# Patient Record
Sex: FEMALE | Race: WHITE | NOT HISPANIC OR LATINO | Employment: FULL TIME | URBAN - NONMETROPOLITAN AREA
[De-identification: names, ages, dates, MRNs, and addresses within clinical notes are randomized per-mention and may not be internally consistent; named-entity substitution may affect disease eponyms.]

---

## 2017-01-09 ENCOUNTER — TRANSCRIBE ORDERS (OUTPATIENT)
Dept: ADMINISTRATIVE | Facility: HOSPITAL | Age: 27
End: 2017-01-09

## 2017-01-09 ENCOUNTER — APPOINTMENT (OUTPATIENT)
Dept: LAB | Facility: CLINIC | Age: 27
End: 2017-01-09
Payer: COMMERCIAL

## 2017-01-09 ENCOUNTER — GENERIC CONVERSION - ENCOUNTER (OUTPATIENT)
Dept: OTHER | Facility: OTHER | Age: 27
End: 2017-01-09

## 2017-01-09 DIAGNOSIS — E04.1 NONTOXIC SINGLE THYROID NODULE: ICD-10-CM

## 2017-01-09 DIAGNOSIS — R94.6 ABNORMAL RESULTS OF THYROID FUNCTION STUDIES: ICD-10-CM

## 2017-01-09 LAB
T4 FREE SERPL-MCNC: 1.21 NG/DL (ref 0.76–1.46)
TSH SERPL DL<=0.05 MIU/L-ACNC: 1.16 UIU/ML (ref 0.36–3.74)

## 2017-01-09 PROCEDURE — 84439 ASSAY OF FREE THYROXINE: CPT

## 2017-01-09 PROCEDURE — 36415 COLL VENOUS BLD VENIPUNCTURE: CPT

## 2017-01-09 PROCEDURE — 84443 ASSAY THYROID STIM HORMONE: CPT

## 2017-01-24 ENCOUNTER — ALLSCRIPTS OFFICE VISIT (OUTPATIENT)
Dept: OTHER | Facility: OTHER | Age: 27
End: 2017-01-24

## 2017-05-01 DIAGNOSIS — R94.6 ABNORMAL RESULTS OF THYROID FUNCTION STUDIES: ICD-10-CM

## 2017-05-01 DIAGNOSIS — E78.5 HYPERLIPIDEMIA: ICD-10-CM

## 2017-05-03 ENCOUNTER — TRANSCRIBE ORDERS (OUTPATIENT)
Dept: LAB | Facility: CLINIC | Age: 27
End: 2017-05-03

## 2017-05-03 ENCOUNTER — APPOINTMENT (OUTPATIENT)
Dept: LAB | Facility: CLINIC | Age: 27
End: 2017-05-03
Payer: COMMERCIAL

## 2017-05-03 DIAGNOSIS — E78.5 HYPERLIPIDEMIA: ICD-10-CM

## 2017-05-03 DIAGNOSIS — R94.6 ABNORMAL RESULTS OF THYROID FUNCTION STUDIES: ICD-10-CM

## 2017-05-03 LAB
T4 FREE SERPL-MCNC: 0.93 NG/DL (ref 0.76–1.46)
TRIGL SERPL-MCNC: 166 MG/DL
TSH SERPL DL<=0.05 MIU/L-ACNC: 3.33 UIU/ML (ref 0.36–3.74)

## 2017-05-03 PROCEDURE — 84439 ASSAY OF FREE THYROXINE: CPT

## 2017-05-03 PROCEDURE — 84478 ASSAY OF TRIGLYCERIDES: CPT

## 2017-05-03 PROCEDURE — 36415 COLL VENOUS BLD VENIPUNCTURE: CPT

## 2017-05-03 PROCEDURE — 84443 ASSAY THYROID STIM HORMONE: CPT

## 2017-05-04 ENCOUNTER — GENERIC CONVERSION - ENCOUNTER (OUTPATIENT)
Dept: OTHER | Facility: OTHER | Age: 27
End: 2017-05-04

## 2017-05-30 ENCOUNTER — ALLSCRIPTS OFFICE VISIT (OUTPATIENT)
Dept: OTHER | Facility: OTHER | Age: 27
End: 2017-05-30

## 2017-06-22 DIAGNOSIS — E04.1 NONTOXIC SINGLE THYROID NODULE: ICD-10-CM

## 2017-06-28 ENCOUNTER — APPOINTMENT (OUTPATIENT)
Dept: LAB | Facility: CLINIC | Age: 27
End: 2017-06-28
Payer: COMMERCIAL

## 2017-06-28 ENCOUNTER — GENERIC CONVERSION - ENCOUNTER (OUTPATIENT)
Dept: OTHER | Facility: OTHER | Age: 27
End: 2017-06-28

## 2017-06-28 DIAGNOSIS — R94.6 ABNORMAL RESULTS OF THYROID FUNCTION STUDIES: ICD-10-CM

## 2017-06-28 LAB
T4 FREE SERPL-MCNC: 1.01 NG/DL (ref 0.76–1.46)
TSH SERPL DL<=0.05 MIU/L-ACNC: 2 UIU/ML (ref 0.36–3.74)

## 2017-06-28 PROCEDURE — 84439 ASSAY OF FREE THYROXINE: CPT

## 2017-06-28 PROCEDURE — 84443 ASSAY THYROID STIM HORMONE: CPT

## 2017-09-25 DIAGNOSIS — R94.6 ABNORMAL RESULTS OF THYROID FUNCTION STUDIES: ICD-10-CM

## 2017-09-25 DIAGNOSIS — E04.1 NONTOXIC SINGLE THYROID NODULE: ICD-10-CM

## 2017-09-26 ENCOUNTER — TRANSCRIBE ORDERS (OUTPATIENT)
Dept: LAB | Facility: CLINIC | Age: 27
End: 2017-09-26

## 2017-09-26 ENCOUNTER — APPOINTMENT (OUTPATIENT)
Dept: LAB | Facility: CLINIC | Age: 27
End: 2017-09-26
Payer: COMMERCIAL

## 2017-09-26 DIAGNOSIS — E04.1 NONTOXIC SINGLE THYROID NODULE: ICD-10-CM

## 2017-09-26 DIAGNOSIS — R94.6 NONSPECIFIC ABNORMAL RESULTS OF THYROID FUNCTION STUDY: Primary | ICD-10-CM

## 2017-09-26 LAB
T4 FREE SERPL-MCNC: 0.94 NG/DL (ref 0.76–1.46)
TSH SERPL DL<=0.05 MIU/L-ACNC: 2.69 UIU/ML (ref 0.36–3.74)

## 2017-09-26 PROCEDURE — 84443 ASSAY THYROID STIM HORMONE: CPT

## 2017-09-26 PROCEDURE — 36415 COLL VENOUS BLD VENIPUNCTURE: CPT

## 2017-09-26 PROCEDURE — 84439 ASSAY OF FREE THYROXINE: CPT

## 2017-09-27 ENCOUNTER — GENERIC CONVERSION - ENCOUNTER (OUTPATIENT)
Dept: OTHER | Facility: OTHER | Age: 27
End: 2017-09-27

## 2017-10-02 ENCOUNTER — ALLSCRIPTS OFFICE VISIT (OUTPATIENT)
Dept: OTHER | Facility: OTHER | Age: 27
End: 2017-10-02

## 2017-10-17 ENCOUNTER — HOSPITAL ENCOUNTER (OUTPATIENT)
Dept: ULTRASOUND IMAGING | Facility: HOSPITAL | Age: 27
Discharge: HOME | End: 2017-10-17
Payer: COMMERCIAL

## 2017-10-17 DIAGNOSIS — E04.1 NONTOXIC SINGLE THYROID NODULE: ICD-10-CM

## 2017-10-17 PROCEDURE — 76536 US EXAM OF HEAD AND NECK: CPT

## 2017-10-19 ENCOUNTER — GENERIC CONVERSION - ENCOUNTER (OUTPATIENT)
Dept: OTHER | Facility: OTHER | Age: 27
End: 2017-10-19

## 2017-10-27 NOTE — PROGRESS NOTES
Assessment  1  Hypothyroidism (244 9) (E03 9)   2  Hashimoto's thyroiditis (245 2) (E06 3)   3  Thyroid nodule (241 0) (E04 1)   4  Obesity (278 00) (E66 9)    Plan  Elevated TSH, Thyroid nodule    · Levothyroxine Sodium 25 MCG Oral Tablet (Synthroid)   Rx By: Naz Mccrary; Dispense: 30 Days ; #:45 Tablet; Refill: 0;For: Elevated TSH, Thyroid nodule; SOLANGE = N; Sent To: [facility]; Last Updated By: Luis Saenz; 10/2/2017 8:07:22 AM  Hashimoto's thyroiditis    · (1) T4, FREE; Status:Active; Requested for:99Low0708;    Perform:[facility]; Due:97Xbd5578; Ordered;For:Hashimoto's thyroiditis; Ordered By:Gayle Rai;  Hashimoto's thyroiditis, Hypothyroidism    · (1) TSH; Status:Active; Requested for:49Dmm9177;    Perform:[facility]; Due:12Tjo3945; Ordered;For:Hashimoto's thyroiditis, Hypothyroidism; Ordered By:Gayle Rai;  Hypothyroidism    · Levothyroxine Sodium 50 MCG Oral Tablet; take 1 tablet every day   Rx By: Luis Saenz; Dispense: 90 Days ; #:90 Tablet; Refill: 3;For: Hypothyroidism; SOLANGE = N; Verified Transmission to [facility]); Last Updated By: System, [employer]; 10/2/2017 8:08:51 AM  Thyroid nodule    · Follow-up visit in 6 months Evaluation and Treatment  Follow-up  Status: Complete   Done: 75YRG3156   Ordered; For: Thyroid nodule; Ordered By: Luis Saenz Performed:  Due: 15MYD0244; Last Updated By: Earl Wills; 10/2/2017 8:08:34 AM    Discussion/Summary  Hypothyroidism secondary to Hashimoto thyroiditis- TSH slightly above goal, goal TSH between 1 and 2  Increase levothyroxine to 50 mcg daily, repeat thyroid function test within the next 6-8 weeks  thyroid nodule - she has an upcoming thyroid ultrasound scheduled for the next 2 weeks, will follow     obesity- counseled and metabolic complications obesity, focus on dietary lifestyle modification and weight loss, repeat triglycerides next here      Counseling Documentation With Imm: The patient was counseled regarding diagnostic results,-- instructions for management,-- risk factor reductions,-- impressions,-- risks and benefits of treatment options,-- importance of compliance with treatment  Patient's Capacity to Self-Care: Patient is able to Self-Care  Medication SE Review and Pt Understands Tx: Possible side effects of new medications were reviewed with the patient/guardian today  The treatment plan was reviewed with the patient/guardian  The patient/guardian understands and agrees with the treatment plan      Chief Complaint  Chief Complaint Free Text Note Form: Follow up  History of Present Illness  Thyroid Nodule: The patient is being seen for a follow-up of a thyroid nodule  Nodule characteristics: located in the left lobe  Symptoms:  weight gain, but-- no neck mass,-- no neck pain,-- no dysphonia,-- no dysphagia,-- no fatigue,-- no anxiety,-- no irritability,-- no tremor-- and-- no frequent stools  The patient is not currently being treated for this problem  Pertinent medical history:  no radiation exposure  Family history:  Hashimoto's thyroiditis, but-- no thyroid cancer  Hypothyroidism: The patient is being seen for follow-up of hypothyroidism  The patient has Hashimoto's thyroiditis  Current treatment includes levothyroxine  Symptoms:  weight gain, but-- no fatigue,-- no weight loss,-- no insomnia,-- no constipation,-- no diarrhea,-- no dysphagia,-- no neck swelling-- and-- no peripheral edema  Risk factors:  no previous head/neck irradiation  Family history:  thyroid disorder  Review of Systems  ROS Reviewed:   ROS reviewed  Endo Adult ROS Female Established v2 Update - [facility]:   Constitutional/General: recent weight gain,-- no recent weight loss,-- no poor energy/fatigue,-- increased energy level,-- no insomnia/sleep problems,-- no fever-- and-- no feeling weak  Breasts: no nipple discharge     Heart: no high blood pressure,-- no chest pain/tightness,-- no rapid/racing heart rate-- and-- no palpitations  Genitourinary - Urinary: no frequent urination,-- no excess urination-- and-- no urinating during the night  Eyes: no blurred vision,-- no double vision,-- no bulging eyes,-- no gritty/scratchy eyes-- and-- no excessive tearing  Mouth / Throat: no hoarseness-- and-- no difficulty swallowing  Neck: no lumps,-- no swollen glands,-- no neck pain,-- no neck stiffness-- and-- no enlarged thyroid  Respiratory: no wheezing,-- no asthma-- and-- no persistent cough  Musculoskeletal: no muscle aches/pain,-- no joint aches/pain-- and-- no muscle weakness  Skin & Hair: no dry skin,-- no acne,-- the hair texture was not oily,-- no hair loss-- and-- no excessive hair growth  Gastrointestinal: no constipation,-- no diarrhea,-- no waking at night to drink-- and-- no stomach ache  Neurological: no blackouts,-- no weakness-- and-- no tremors  Reproductive: periods occur every 28 days,-- periods usually last 4-5 days,-- date of last menstruation is unknown,-- periods are regular,-- discomfort with periods,-- no excessive bleeding with periods-- and-- mood swings  Endocrine: no feeling hot frequently,-- no feeling cold frequently,-- no shifts between feeling hot and cold,-- no cold hands or feet,-- no excessive sweating,-- thyroid problems,-- no blood sugar problems,-- no excessive thirst,-- no excessive hunger,-- no change in shoe size,-- no nausea or vomiting-- and-- no shaky hands  Active Problems  1  Elevated TSH (794 5) (R94 6)   2  Fatigue (780 79) (R53 83)   3  Hyperlipidemia, unspecified (272 4) (E78 5)   4  Obesity (278 00) (E66 9)   5  Puncture wound of right lower leg (891 0) (S81 831A)   6  Thyroid nodule (241 0) (E04 1)    Past Medical History  1  History of dysmenorrhea (V13 29) (Z87 42)  Active Problems And Past Medical History Reviewed: The active problems and past medical history were reviewed and updated today  Surgical History  1  History of Oral Surgery Tooth Extraction [place] Tooth   2  History of Rotator Cuff Repair Acute   3  History of Tonsillectomy  Surgical History Reviewed: The surgical history was reviewed and updated today  Family History  Mother    1  Family history of arthritis (V17 7) (Z82 61)  Sister    2  Family history of arthritis (V17 7) (Z82 61)   3  Family history of Graves' disease (V18 19) (Z83 49)   4  Family history of Hashimoto thyroiditis (V18 19) (Z83 49)  Grandmother    5  Family history of malignant neoplasm (V16 9) (Z80 9)  Maternal Grandmother    6  Family history of hyperthyroidism (V18 19) (Z83 49)  Grandfather    7  Family history of Diabetes mellitus, stable   8  Family history of cardiac disorder (V17 49) (Z82 49)   9  Family history of malignant neoplasm (V16 9) (Z80 9)  Maternal Aunt    10  Family history of hyperthyroidism (V18 19) (Z83 49)  Paternal Aunt    6  Family history of goiter (V18 19) (Z83 49)   12  Family history of hyperthyroidism (V18 19) (Z83 49)  Family History Reviewed: The family history was reviewed and updated today  Social History   · Denied: History of Current smoker   · Occasional caffeine consumption   · Social alcohol use (Z78 9)  Social History Reviewed: The social history was reviewed and updated today  Current Meds   1  Flexeril 5 MG TABS; Therapy: (Recorded:24Oct2016) to Recorded   2  Levothyroxine Sodium 25 MCG Oral Tablet; take 1 5 tablet by mouth once daily; Therapy: 19YKJ4488 to (Evaluate:10Aug2017)  Requested for: 57DEI5023; Last   Rx:95Tun5636 Ordered   3  Ocella TABS Recorded   4  Sertraline HCl - 50 MG Oral Tablet; take 1 tablet by mouth once daily; Therapy: (Recorded:22Nov2016) to Recorded  Medication List Reviewed: The medication list was reviewed and updated today  Allergies  1   Tussi-12 SUSP    Vitals  Vital Signs    Recorded: 03SVT0709 07:53AM   Heart Rate 76   Systolic 693   Diastolic 80   Height 5 ft 5 in   Weight 189 lb 4 00 oz   BMI Calculated 31 49   BSA Calculated 1 93     Physical Exam    Constitutional   General appearance: No acute distress, well appearing and well nourished  Eyes   Conjunctiva and lids: No swelling, erythema, or discharge  Pupils: Equal, round and reactive to light  The sclera are anicteric  Extraocular movements are intact  Ears, Nose, Mouth, and Throat   External inspection of ears, nose and lips: Normal     Exam of Head: The head is atraumatic and normocephalic  Neck: The neck is supple  The thyroid is normal in size with no palpable nodules  Pulmonary   Auscultation of lungs: Clear to auscultation bilaterally with normal chest expansion  Cardiovascular   Auscultation of heart: Normal rate and rhythm with no murmurs, gallops or rubs  Examination of extremities for edema and/or varicosities: Normal     Abdomen   Abdomen: Abdomen is soft, non-tender with normal bowel sounds  Lymphatic   Palpation of lymph nodes: No supraclavicular or suboccipital lymphadenopathy  Musculoskeletal   Gait and station: Normal     Inspection/palpation of joints, bones, and muscles: Muscle bulk and tone is normal     Skin   Skin and subcutaneous tissue: Normal skin temperature and color  Neurologic   Motor Strength: Strength is 5/5 bilaterally  Psychiatric   Orientation to person, place and time: Normal     Mood and affect: Affect and attention span are normal        Results/Data  (1) TSH 73Ddf0214 07:24AM [facility] Order Number: [id number]     Test Name Result Flag Reference   TSH 2 690 uIU/mL  0 358-3 740   Patients undergoing fluorescein dye angiography may retain small amounts of fluorescein in the body for 48-72 hours post procedure  Samples containing fluorescein can produce falsely depressed TSH values  If the patient had this procedure,a specimen should be resubmitted post fluorescein clearance            The recommended reference ranges for TSH during pregnancy are as follows:  First trimester 0 1 to 2 5 uIU/mL  Second trimester  0 2 to 3 0 uIU/mL  Third trimester 0 3 to 3 0 uIU/m     (1) T4, FREE 95Wcv5507 07:24AM Mildred Emmanuel Order Number: [id number]     Test Name Result Flag Reference   T4,FREE 0 94 ng/dL  0 76-1 46   Specimen collection should occur prior to Sulfasalazine administration due to the potential for falsely elevated results       Future Appointments    Date/Time Provider Specialty Site   04/02/2018 08:00 AM Kristopher Rascon [facility] ENDOCRINOLOG[facility]     Signatures   Electronically signed by : MOUNA Mcclendon ; Oct  2 2017  8:16AM EST                       (Author)

## 2017-11-08 ENCOUNTER — TRANSCRIBE ORDERS (OUTPATIENT)
Dept: LAB | Facility: CLINIC | Age: 27
End: 2017-11-08

## 2017-11-08 ENCOUNTER — APPOINTMENT (OUTPATIENT)
Dept: LAB | Facility: CLINIC | Age: 27
End: 2017-11-08
Payer: COMMERCIAL

## 2017-11-08 DIAGNOSIS — R94.6 NONSPECIFIC ABNORMAL RESULTS OF THYROID FUNCTION STUDY: ICD-10-CM

## 2017-11-08 LAB
T4 FREE SERPL-MCNC: 1.19 NG/DL (ref 0.76–1.46)
TSH SERPL DL<=0.05 MIU/L-ACNC: 0.81 UIU/ML (ref 0.36–3.74)

## 2017-11-08 PROCEDURE — 36415 COLL VENOUS BLD VENIPUNCTURE: CPT

## 2017-11-08 PROCEDURE — 84439 ASSAY OF FREE THYROXINE: CPT

## 2017-11-08 PROCEDURE — 84443 ASSAY THYROID STIM HORMONE: CPT

## 2017-11-09 ENCOUNTER — GENERIC CONVERSION - ENCOUNTER (OUTPATIENT)
Dept: OTHER | Facility: OTHER | Age: 27
End: 2017-11-09

## 2017-12-01 DIAGNOSIS — E06.3 AUTOIMMUNE THYROIDITIS: ICD-10-CM

## 2017-12-01 DIAGNOSIS — E03.9 HYPOTHYROIDISM: ICD-10-CM

## 2018-01-12 VITALS
HEART RATE: 76 BPM | DIASTOLIC BLOOD PRESSURE: 80 MMHG | HEIGHT: 65 IN | WEIGHT: 189.25 LBS | BODY MASS INDEX: 31.53 KG/M2 | SYSTOLIC BLOOD PRESSURE: 124 MMHG

## 2018-01-13 VITALS
HEART RATE: 76 BPM | BODY MASS INDEX: 30.66 KG/M2 | HEIGHT: 65 IN | WEIGHT: 184.01 LBS | DIASTOLIC BLOOD PRESSURE: 76 MMHG | SYSTOLIC BLOOD PRESSURE: 102 MMHG

## 2018-01-13 NOTE — RESULT NOTES
Verified Results  (1) TSH 28Jun2017 07:20AM Flora Mchugh Order Number: [id number]     Test Name Result Flag Reference   TSH 2 000 uIU/mL  0 358-3 740   Patients undergoing fluorescein dye angiography may retain small amounts of fluorescein in the body for 48-72 hours post procedure  Samples containing fluorescein can produce falsely depressed TSH values  If the patient had this procedure,a specimen should be resubmitted post fluorescein clearance            The recommended reference ranges for TSH during pregnancy are as follows:  First trimester 0 1 to 2 5 uIU/mL  Second trimester  0 2 to 3 0 uIU/mL  Third trimester 0 3 to 3 0 uIU/m     (1) T4, FREE 28Jun2017 07:20AM Flora Mchugh Order Number: [id number]     Test Name Result Flag Reference   T4,FREE 1 01 ng/dL  0 76-1 46

## 2018-01-13 NOTE — RESULT NOTES
Discussion/Summary   Normal        Verified Results  (1) T4, FREE 63MXJ4766 07:15AM Sara Camacho Kocher     Test Name Result Flag Reference   T4,FREE 1 19 ng/dL  0 76-1 46   Specimen collection should occur prior to Sulfasalazine administration due to the potential for falsely elevated results  (1) TSH 83MGJ4003 07:15AM Roskos, Jonelle Kocher     Test Name Result Flag Reference   TSH 0 810 uIU/mL  0 358-3 740   This is a patient instruction: This test is non-fasting  Please drink two glasses of water morning of bloodwork  Patients undergoing fluorescein dye angiography may retain small amounts of fluorescein in the body for 48-72 hours post procedure  Samples containing fluorescein can produce falsely depressed TSH values  If the patient had this procedure,a specimen should be resubmitted post fluorescein clearance            The recommended reference ranges for TSH during pregnancy are as follows:  First trimester 0 1 to 2 5 uIU/mL  Second trimester  0 2 to 3 0 uIU/mL  Third trimester 0 3 to 3 0 uIU/m

## 2018-01-15 VITALS
BODY MASS INDEX: 30.16 KG/M2 | HEIGHT: 65 IN | DIASTOLIC BLOOD PRESSURE: 80 MMHG | HEART RATE: 84 BPM | SYSTOLIC BLOOD PRESSURE: 122 MMHG | WEIGHT: 181.03 LBS

## 2018-01-15 NOTE — RESULT NOTES
Message   has upcoming f/u     Verified Results  (1) TSH [id number] 07:16AM Jammie New Order Number: [id number]     Test Name Result Flag Reference   TSH 1 160 uIU/mL  0 358-3 740   - Patient Instructions: This bloodwork is non-fasting  Please drink two glasses of water morning of bloodwork  - Patient Instructions: This bloodwork is non-fasting  Please drink two glasses of water morning of bloodwork  Patients undergoing fluorescein dye angiography may retain small amounts of fluorescein in the body for 48-72 hours post procedure  Samples containing fluorescein can produce falsely depressed TSH values  If the patient had this procedure,a specimen should be resubmitted post fluorescein clearance  The recommended reference ranges for TSH during pregnancy are as follows:  First trimester 0 1 to 2 5 uIU/mL  Second trimester  0 2 to 3 0 uIU/mL  Third trimester 0 3 to 3 0 uIU/m     (1) T4, FREE [id number] 07:16AM Kaylyn Moreno   LISETH Order Number: [id number]     Test Name Result Flag Reference   T4,FREE 1 21 ng/dL  0 76-1 46   - Patient Instructions: This bloodwork is non-fasting  Please drink two glasses of water morning of bloodwork

## 2018-01-15 NOTE — RESULT NOTES
Discussion/Summary    thyroid labs are normal, but not optimal  May do better with slightly higher does  Is she taking medication daily and properly with no missed doses? If not, make sure to take as directed  If she is taking properly, increase to 1 5 tabs per day  Check TSH/Free T4 in 6 weeks  Also, triglycerides improving         Verified Results  (1) TSH [id number] 07:13AM [facility]: [id number]     Test Name Result Flag Reference   TSH 3 330 uIU/mL  0 358-3 740   - Patient Instructions: This bloodwork is non-fasting  Please drink two glasses of water morning of bloodwork  Patients undergoing fluorescein dye angiography may retain small amounts of fluorescein in the body for 48-72 hours post procedure  Samples containing fluorescein can produce falsely depressed TSH values  If the patient had this procedure,a specimen should be resubmitted post fluorescein clearance  The recommended reference ranges for TSH during pregnancy are as follows:  First trimester 0 1 to 2 5 uIU/mL  Second trimester  0 2 to 3 0 uIU/mL  Third trimester 0 3 to 3 0 uIU/m     (1) T4, FREE [id number] 07:13AM [facility] Order Number: [id number]     Test Name Result Flag Reference   T4,FREE 0 93 ng/dL  0 76-1 46     (1) TRIGLYCERIDE [id number] 07:13AM [facility] Order Number: [id number]     Test Name Result Flag Reference   TRIGLYCERIDES 166 mg/dL H <=150   Specimen collection should occur prior to N-Acetylcysteine or Metamizole administration due to the potential for falsely depressed results        Triglyceride:         Normal              <150 mg/dl       Borderline High    150-199 mg/dl       High               200-499 mg/dl       Very High          >499 mg/dl

## 2018-01-15 NOTE — RESULT NOTES
Message   tsh slightly high - start levothyroxine 25 mcg daily and repeat tsh and free t4 in 6 weeks      Verified Results  (1) TSH 40WMS1341 04:29PM Darline Mention   TW Order Number: [id number]     Test Name Result Flag Reference   TSH 4 120 uIU/mL H 0 358-3 740   - Patient Instructions: This bloodwork is non-fasting  Please drink two glasses of water morning of bloodwork  - Patient Instructions: This bloodwork is non-fasting  Please drink two glasses of water morning of bloodwork  Patients undergoing fluorescein dye angiography may retain small amounts of fluorescein in the body for 48-72 hours post procedure  Samples containing fluorescein can produce falsely depressed TSH values  If the patient had this procedure,a specimen should be resubmitted post fluorescein clearance  The recommended reference ranges for TSH during pregnancy are as follows:  First trimester 0 1 to 2 5 uIU/mL  Second trimester  0 2 to 3 0 uIU/mL  Third trimester 0 3 to 3 0 uIU/m     (1) T4, FREE 22Nov2016 04:29PM Darline Mention   TW Order Number: [id number]     Test Name Result Flag Reference   T4,FREE 0 96 ng/dL  0 76-1 46   - Patient Instructions: This bloodwork is non-fasting  Please drink two glasses of water morning of bloodwork       (1) ANTITHYROGLOBULIN ANTIBODY 21PGN0154 04:29PM Darline Mention   TW Order Number: [id number]     Test Name Result Flag Reference   THYROGLOB AB 1 9 IU/mL H 0 0 - 0 9   Thyroglobulin Antibody measured by [facility] Methodology    Performed at:  [address]  [id number]  : Francisco Lombardi MD, Phone:  [phone]

## 2018-01-16 NOTE — RESULT NOTES
Discussion/Summary   normal appt 10/2     Verified Results  (1) TSH 20Gni7585 07:24AM Gloria Nemesio Order Number: [id number]     Test Name Result Flag Reference   TSH 2 690 uIU/mL  0 358-3 740   Patients undergoing fluorescein dye angiography may retain small amounts of fluorescein in the body for 48-72 hours post procedure  Samples containing fluorescein can produce falsely depressed TSH values  If the patient had this procedure,a specimen should be resubmitted post fluorescein clearance  The recommended reference ranges for TSH during pregnancy are as follows:  First trimester 0 1 to 2 5 uIU/mL  Second trimester  0 2 to 3 0 uIU/mL  Third trimester 0 3 to 3 0 uIU/m     (1) T4, FREE [id number] 07:24AM Gloria Nemesio Order Number: [id number]     Test Name Result Flag Reference   T4,FREE 0 94 ng/dL  0 76-1 46   Specimen collection should occur prior to Sulfasalazine administration due to the potential for falsely elevated results

## 2018-03-28 RX ORDER — DROSPIRENONE AND ETHINYL ESTRADIOL 0.03MG-3MG
1 KIT ORAL DAILY
COMMUNITY

## 2018-03-28 RX ORDER — LEVOTHYROXINE SODIUM 0.05 MG/1
1 TABLET ORAL DAILY
COMMUNITY
Start: 2017-10-02 | End: 2018-04-02 | Stop reason: SDUPTHER

## 2018-03-28 RX ORDER — CYCLOBENZAPRINE HCL 5 MG
1 TABLET ORAL AS NEEDED
COMMUNITY
End: 2019-04-08 | Stop reason: ALTCHOICE

## 2018-04-02 ENCOUNTER — OFFICE VISIT (OUTPATIENT)
Dept: ENDOCRINOLOGY | Facility: CLINIC | Age: 28
End: 2018-04-02
Payer: COMMERCIAL

## 2018-04-02 VITALS
BODY MASS INDEX: 32.65 KG/M2 | HEIGHT: 65 IN | HEART RATE: 106 BPM | SYSTOLIC BLOOD PRESSURE: 110 MMHG | DIASTOLIC BLOOD PRESSURE: 70 MMHG | WEIGHT: 196 LBS

## 2018-04-02 DIAGNOSIS — E04.1 THYROID NODULE: Primary | ICD-10-CM

## 2018-04-02 DIAGNOSIS — E06.3 HASHIMOTO'S THYROIDITIS: ICD-10-CM

## 2018-04-02 DIAGNOSIS — E03.9 HYPOTHYROIDISM, UNSPECIFIED TYPE: ICD-10-CM

## 2018-04-02 DIAGNOSIS — E78.5 HYPERLIPIDEMIA, UNSPECIFIED HYPERLIPIDEMIA TYPE: ICD-10-CM

## 2018-04-02 PROBLEM — R53.83 FATIGUE: Status: ACTIVE | Noted: 2017-05-30

## 2018-04-02 PROBLEM — E66.9 OBESITY: Status: ACTIVE | Noted: 2017-01-24

## 2018-04-02 PROCEDURE — 99214 OFFICE O/P EST MOD 30 MIN: CPT | Performed by: PHYSICIAN ASSISTANT

## 2018-04-02 RX ORDER — LEVOTHYROXINE SODIUM 0.05 MG/1
50 TABLET ORAL DAILY
Qty: 90 TABLET | Refills: 3 | Status: SHIPPED | OUTPATIENT
Start: 2018-04-02 | End: 2018-10-04

## 2018-04-02 NOTE — LETTER
April 2, 2018     Vidya Tellez PA-C  [address]    Patient: Juventino Pimentel   YOB: 1990   Date of Visit: 4/2/2018       Dear Dr Stanford Ra: Thank you for referring Gissel Hong to me for evaluation  Below are my notes for this consultation  If you have questions, please do not hesitate to call me  I look forward to following your patient along with you  Sincerely,        Loly Mishra PA-C        CC: No Recipients  Loly Mishra PA-C  4/2/2018  8:40 AM  Sign at close encounter    Established Patient Progress Note       Chief Complaint   Patient presents with    Thyroid Problem    Hypothyroidism        History of Present Illness:     Juventino Pimentel is a 32 y o  female with a history of hypothyroidism due to hashimoto's thyroiditis  She is taking levothyroxine 50mcg daily  In generally, feeling Ok  Energy levels Ok  Still some difficulty sleeping  She is on OCP with no plans for pregnancy  She is on zoloft since age 13, considering talking to PCP about coming off medication since this summer since depression has not been a problem  For the thyroid nodules, she had ultrasound 10/2017 which showed no nodule but small possible lymph node  Repeat u/s recommended in 1 year  She sometimes has dysphagia when turning her head to the side or when wakes up in middle of night  She coleman have history of mild elevation of triglycerides which showed some improvement in 5/2017 labs  Patient Active Problem List   Diagnosis    Elevated TSH    Fatigue    Hashimoto's thyroiditis    Hyperlipidemia, unspecified    Hypothyroidism    Obesity    Thyroid nodule      History reviewed  No pertinent past medical history     Past Surgical History:   Procedure Laterality Date    NOSE SURGERY      ROTATOR CUFF REPAIR Right     TONSILLECTOMY        Family History   Problem Relation Age of Onset    Thyroid disease unspecified Sister    Donovan Early Thyroid disease unspecified Maternal Aunt     Breast cancer Maternal Grandmother     Thyroid disease unspecified Maternal Grandmother     Diabetes unspecified Maternal Grandfather      Social History   Substance Use Topics    Smoking status: Never Smoker    Smokeless tobacco: Never Used    Alcohol use Yes     Allergies   Allergen Reactions    Tussin [Guaifenesin] Hives     Tussi-12 reaction in childhood (hives)       Current Outpatient Prescriptions:     cyclobenzaprine (FLEXERIL) 5 mg tablet, Take 1 tablet by mouth as needed, Disp: , Rfl:     drospirenone-ethinyl estradiol (OCELLA) 3-0 03 MG per tablet, Take 1 tablet by mouth daily, Disp: , Rfl:     levothyroxine 50 mcg tablet, Take 1 tablet (50 mcg total) by mouth daily for 90 days, Disp: 90 tablet, Rfl: 3    sertraline (ZOLOFT) 50 mg tablet, Take 1 tablet by mouth daily, Disp: , Rfl:     Review of Systems   Constitutional: Negative for activity change, appetite change, chills, diaphoresis, fatigue, fever and unexpected weight change  HENT: Negative for trouble swallowing and voice change  Eyes: Negative for visual disturbance  Respiratory: Negative for shortness of breath  Cardiovascular: Negative for chest pain and palpitations  Gastrointestinal: Negative for abdominal pain, constipation and diarrhea  Endocrine: Negative for cold intolerance, heat intolerance, polydipsia, polyphagia and polyuria  Genitourinary: Negative for frequency and menstrual problem  Musculoskeletal: Negative for arthralgias and myalgias  Skin: Negative for rash  Allergic/Immunologic: Negative for food allergies  Neurological: Negative for dizziness and tremors  Hematological: Negative for adenopathy  Psychiatric/Behavioral: Positive for sleep disturbance  All other systems reviewed and are negative  Physical Exam:  Body mass index is 32 62 kg/m²    /70   Pulse (!) 106   Ht 5' 5" (1 651 m)   Wt 88 9 kg (196 lb)   BMI 32 62 kg/m²     Wt Readings from Last 3 Encounters:   04/02/18 88 9 kg (196 lb)   10/02/17 85 8 kg (189 lb 4 oz)   05/30/17 83 5 kg (184 lb 0 1 oz)       Physical Exam   Constitutional: She is oriented to person, place, and time  She appears well-developed and well-nourished  No distress  HENT:   Head: Normocephalic and atraumatic  Eyes: Conjunctivae are normal  Pupils are equal, round, and reactive to light  Neck: Normal range of motion  Neck supple  No thyromegaly present  Cardiovascular: Normal rate, regular rhythm and normal heart sounds  Pulmonary/Chest: Effort normal and breath sounds normal  No respiratory distress  She has no wheezes  She has no rales  Abdominal: Soft  Bowel sounds are normal  She exhibits no distension  There is no tenderness  Musculoskeletal: Normal range of motion  She exhibits no edema  Neurological: She is alert and oriented to person, place, and time  Skin: Skin is warm and dry  Psychiatric: She has a normal mood and affect  Vitals reviewed  Labs:     11/8/2017 TSH 0 810 Free T4 1 19  Lab Results   Component Value Date    CREATININE 0 86 10/11/2016    BUN 10 10/11/2016     10/11/2016    K 3 7 10/11/2016     10/11/2016    CO2 23 10/11/2016     eGFR   Date Value Ref Range Status   10/11/2016 >60 0 ml/min/1 73sq m Final       Lab Results   Component Value Date    CHOL 190 10/11/2016    HDL 74 (H) 10/11/2016    TRIG 166 (H) 05/03/2017       Lab Results   Component Value Date    ALT 49 10/11/2016    AST 27 10/11/2016    ALKPHOS 68 10/11/2016    BILITOT 0 61 10/11/2016       Lab Results   Component Value Date    FREET4 1 19 11/08/2017         Impression & Plan:    Problem List Items Addressed This Visit     Hashimoto's thyroiditis    Relevant Medications    levothyroxine 50 mcg tablet    Hyperlipidemia, unspecified     Elevated triglycerides improving based on 5/2017 labs    Will repeat with next lab test           Relevant Orders    Triglycerides Lab Collect Hypothyroidism     TSH at goal based on last lab testing  Continue medication  Repeat testing in about 1 month and then again 6 weeks after stopping zoloft if plans to stop zoloft due to possible need for dose change  Relevant Medications    levothyroxine 50 mcg tablet    Other Relevant Orders    TSH, 3rd generation    T4, free    TSH, 3rd generation    T4, free    US thyroid    Thyroid nodule - Primary     Resolved on 10/2017 ultrasound however will repeat u/s 10/2018 to evaluate lymph node, probably reactive according to last u/s report  Relevant Medications    levothyroxine 50 mcg tablet    Other Relevant Orders    TSH, 3rd generation    T4, free    TSH, 3rd generation    T4, free    US thyroid          Orders Placed This Encounter   Procedures    US thyroid     Standing Status:   Future     Standing Expiration Date:   4/2/2019     Scheduling Instructions:      No prep required  Please bring your physician order, insurance cards, a form of photo ID and a list of your medications with you  Arrive 15 minutes prior to your appointment time in order to register  Order Specific Question:   Reason for Exam:     Answer:   thyroid nodule/lymph node     Order Specific Question:   Is the patient pregnant? Answer:   No    TSH, 3rd generation     This is a patient instruction: This test is non-fasting  Please drink two glasses of water morning of bloodwork   T4, free    TSH, 3rd generation     This is a patient instruction: This test is non-fasting  Please drink two glasses of water morning of bloodwork  Standing Status:   Future     Standing Expiration Date:   4/2/2019    T4, free     Standing Status:   Future     Standing Expiration Date:   4/2/2019    Triglycerides Lab Collect     Standing Status:   Future     Standing Expiration Date:   4/2/2019       Patient Instructions   Continue levothyroxine at current dose  Check Lab testing for TSH/Free T4 in about 1 month    If you come off zoloft do lab testing 6 weeks after stopping zoloft again for TSH/Free T4  Repeat ultrasound in October, before next visit  Discussed with the patient and all questioned fully answered  She will call me if any problems arise  Follow-up appointment in 6 months       Counseled patient on diagnostic results, prognosis, risk and benefit of treatment options, instruction for management, importance of treatment compliance, Risk  factor reduction and impressions      Renee Cherry PA-C

## 2018-04-02 NOTE — PROGRESS NOTES
Established Patient Progress Note       Chief Complaint   Patient presents with    Thyroid Problem    Hypothyroidism        History of Present Illness:     Juventino Pimentel is a 32 y o  female with a history of hypothyroidism due to hashimoto's thyroiditis  She is taking levothyroxine 50mcg daily  In generally, feeling Ok  Energy levels Ok  Still some difficulty sleeping  She is on OCP with no plans for pregnancy  She is on zoloft since age 13, considering talking to PCP about coming off medication since this summer since depression has not been a problem  For the thyroid nodules, she had ultrasound 10/2017 which showed no nodule but small possible lymph node  Repeat u/s recommended in 1 year  She sometimes has dysphagia when turning her head to the side or when wakes up in middle of night  She coleman have history of mild elevation of triglycerides which showed some improvement in 5/2017 labs  Patient Active Problem List   Diagnosis    Elevated TSH    Fatigue    Hashimoto's thyroiditis    Hyperlipidemia, unspecified    Hypothyroidism    Obesity    Thyroid nodule      History reviewed  No pertinent past medical history     Past Surgical History:   Procedure Laterality Date    NOSE SURGERY      ROTATOR CUFF REPAIR Right     TONSILLECTOMY        Family History   Problem Relation Age of Onset    Thyroid disease unspecified Sister     Thyroid disease unspecified Maternal Aunt     Breast cancer Maternal Grandmother     Thyroid disease unspecified Maternal Grandmother     Diabetes unspecified Maternal Grandfather      Social History   Substance Use Topics    Smoking status: Never Smoker    Smokeless tobacco: Never Used    Alcohol use Yes     Allergies   Allergen Reactions    Tussin [Guaifenesin] Hives     Tussi-12 reaction in childhood (hives)       Current Outpatient Prescriptions:     cyclobenzaprine (FLEXERIL) 5 mg tablet, Take 1 tablet by mouth as needed, Disp: , Rfl:    drospirenone-ethinyl estradiol (OCELLA) 3-0 03 MG per tablet, Take 1 tablet by mouth daily, Disp: , Rfl:     levothyroxine 50 mcg tablet, Take 1 tablet (50 mcg total) by mouth daily for 90 days, Disp: 90 tablet, Rfl: 3    sertraline (ZOLOFT) 50 mg tablet, Take 1 tablet by mouth daily, Disp: , Rfl:     Review of Systems   Constitutional: Negative for activity change, appetite change, chills, diaphoresis, fatigue, fever and unexpected weight change  HENT: Negative for trouble swallowing and voice change  Eyes: Negative for visual disturbance  Respiratory: Negative for shortness of breath  Cardiovascular: Negative for chest pain and palpitations  Gastrointestinal: Negative for abdominal pain, constipation and diarrhea  Endocrine: Negative for cold intolerance, heat intolerance, polydipsia, polyphagia and polyuria  Genitourinary: Negative for frequency and menstrual problem  Musculoskeletal: Negative for arthralgias and myalgias  Skin: Negative for rash  Allergic/Immunologic: Negative for food allergies  Neurological: Negative for dizziness and tremors  Hematological: Negative for adenopathy  Psychiatric/Behavioral: Positive for sleep disturbance  All other systems reviewed and are negative  Physical Exam:  Body mass index is 32 62 kg/m²  /70   Pulse (!) 106   Ht 5' 5" (1 651 m)   Wt 88 9 kg (196 lb)   BMI 32 62 kg/m²    Wt Readings from Last 3 Encounters:   04/02/18 88 9 kg (196 lb)   10/02/17 85 8 kg (189 lb 4 oz)   05/30/17 83 5 kg (184 lb 0 1 oz)       Physical Exam   Constitutional: She is oriented to person, place, and time  She appears well-developed and well-nourished  No distress  HENT:   Head: Normocephalic and atraumatic  Eyes: Conjunctivae are normal  Pupils are equal, round, and reactive to light  Neck: Normal range of motion  Neck supple  No thyromegaly present  Cardiovascular: Normal rate, regular rhythm and normal heart sounds      Pulmonary/Chest: Effort normal and breath sounds normal  No respiratory distress  She has no wheezes  She has no rales  Abdominal: Soft  Bowel sounds are normal  She exhibits no distension  There is no tenderness  Musculoskeletal: Normal range of motion  She exhibits no edema  Neurological: She is alert and oriented to person, place, and time  Skin: Skin is warm and dry  Psychiatric: She has a normal mood and affect  Vitals reviewed  Labs:     11/8/2017 TSH 0 810 Free T4 1 19  Lab Results   Component Value Date    CREATININE 0 86 10/11/2016    BUN 10 10/11/2016     10/11/2016    K 3 7 10/11/2016     10/11/2016    CO2 23 10/11/2016     eGFR   Date Value Ref Range Status   10/11/2016 >60 0 ml/min/1 73sq m Final       Lab Results   Component Value Date    CHOL 190 10/11/2016    HDL 74 (H) 10/11/2016    TRIG 166 (H) 05/03/2017       Lab Results   Component Value Date    ALT 49 10/11/2016    AST 27 10/11/2016    ALKPHOS 68 10/11/2016    BILITOT 0 61 10/11/2016       Lab Results   Component Value Date    FREET4 1 19 11/08/2017         Impression & Plan:    Problem List Items Addressed This Visit     Hashimoto's thyroiditis    Relevant Medications    levothyroxine 50 mcg tablet    Hyperlipidemia, unspecified     Elevated triglycerides improving based on 5/2017 labs  Will repeat with next lab test           Relevant Orders    Triglycerides Lab Collect    Hypothyroidism     TSH at goal based on last lab testing  Continue medication  Repeat testing in about 1 month and then again 6 weeks after stopping zoloft if plans to stop zoloft due to possible need for dose change  Relevant Medications    levothyroxine 50 mcg tablet    Other Relevant Orders    TSH, 3rd generation    T4, free    TSH, 3rd generation    T4, free    US thyroid    Thyroid nodule - Primary     Resolved on 10/2017 ultrasound however will repeat u/s 10/2018 to evaluate lymph node, probably reactive according to last u/s report  Relevant Medications    levothyroxine 50 mcg tablet    Other Relevant Orders    TSH, 3rd generation    T4, free    TSH, 3rd generation    T4, free    US thyroid          Orders Placed This Encounter   Procedures    US thyroid     Standing Status:   Future     Standing Expiration Date:   4/2/2019     Scheduling Instructions:      No prep required  Please bring your physician order, insurance cards, a form of photo ID and a list of your medications with you  Arrive 15 minutes prior to your appointment time in order to register  Order Specific Question:   Reason for Exam:     Answer:   thyroid nodule/lymph node     Order Specific Question:   Is the patient pregnant? Answer:   No    TSH, 3rd generation     This is a patient instruction: This test is non-fasting  Please drink two glasses of water morning of bloodwork   T4, free    TSH, 3rd generation     This is a patient instruction: This test is non-fasting  Please drink two glasses of water morning of bloodwork  Standing Status:   Future     Standing Expiration Date:   4/2/2019    T4, free     Standing Status:   Future     Standing Expiration Date:   4/2/2019    Triglycerides Lab Collect     Standing Status:   Future     Standing Expiration Date:   4/2/2019       Patient Instructions   Continue levothyroxine at current dose  Check Lab testing for TSH/Free T4 in about 1 month  If you come off zoloft do lab testing 6 weeks after stopping zoloft again for TSH/Free T4  Repeat ultrasound in October, before next visit  Discussed with the patient and all questioned fully answered  She will call me if any problems arise  Follow-up appointment in 6 months       Counseled patient on diagnostic results, prognosis, risk and benefit of treatment options, instruction for management, importance of treatment compliance, Risk  factor reduction and impressions      Alon Bang PA-C

## 2018-04-02 NOTE — ASSESSMENT & PLAN NOTE
TSH at goal based on last lab testing  Continue medication  Repeat testing in about 1 month and then again 6 weeks after stopping zoloft if plans to stop zoloft due to possible need for dose change

## 2018-04-02 NOTE — PATIENT INSTRUCTIONS
Continue levothyroxine at current dose  Check Lab testing for TSH/Free T4 in about 1 month  If you come off zoloft do lab testing 6 weeks after stopping zoloft again for TSH/Free T4  Repeat ultrasound in October, before next visit

## 2018-04-02 NOTE — ASSESSMENT & PLAN NOTE
Resolved on 10/2017 ultrasound however will repeat u/s 10/2018 to evaluate lymph node, probably reactive according to last u/s report

## 2018-05-14 ENCOUNTER — APPOINTMENT (OUTPATIENT)
Dept: LAB | Facility: CLINIC | Age: 28
End: 2018-05-14
Payer: COMMERCIAL

## 2018-05-14 ENCOUNTER — TRANSCRIBE ORDERS (OUTPATIENT)
Dept: LAB | Facility: CLINIC | Age: 28
End: 2018-05-14

## 2018-05-14 DIAGNOSIS — Z00.5 EXAMINATION OF POTENTIAL DONOR OF ORGAN AND TISSUE: ICD-10-CM

## 2018-05-14 DIAGNOSIS — Z00.5 EXAMINATION OF POTENTIAL DONOR OF ORGAN AND TISSUE: Primary | ICD-10-CM

## 2018-05-14 LAB
ABO GROUP BLD: NORMAL
RH BLD: POSITIVE

## 2018-05-14 PROCEDURE — 86900 BLOOD TYPING SEROLOGIC ABO: CPT

## 2018-05-14 PROCEDURE — 86901 BLOOD TYPING SEROLOGIC RH(D): CPT

## 2018-05-14 PROCEDURE — 36415 COLL VENOUS BLD VENIPUNCTURE: CPT

## 2018-07-02 ENCOUNTER — TRANSCRIBE ORDERS (OUTPATIENT)
Dept: LAB | Facility: CLINIC | Age: 28
End: 2018-07-02

## 2018-07-02 ENCOUNTER — APPOINTMENT (OUTPATIENT)
Dept: LAB | Facility: CLINIC | Age: 28
End: 2018-07-02
Payer: COMMERCIAL

## 2018-07-02 DIAGNOSIS — E78.5 HYPERLIPIDEMIA, UNSPECIFIED HYPERLIPIDEMIA TYPE: ICD-10-CM

## 2018-07-02 DIAGNOSIS — E04.1 THYROID NODULE: ICD-10-CM

## 2018-07-02 DIAGNOSIS — E03.9 HYPOTHYROIDISM, UNSPECIFIED TYPE: ICD-10-CM

## 2018-07-02 LAB
T4 FREE SERPL-MCNC: 1.07 NG/DL (ref 0.76–1.46)
TRIGL SERPL-MCNC: 248 MG/DL
TSH SERPL DL<=0.05 MIU/L-ACNC: 1.79 UIU/ML (ref 0.36–3.74)

## 2018-07-02 PROCEDURE — 84443 ASSAY THYROID STIM HORMONE: CPT | Performed by: PHYSICIAN ASSISTANT

## 2018-07-02 PROCEDURE — 84478 ASSAY OF TRIGLYCERIDES: CPT

## 2018-07-02 PROCEDURE — 36415 COLL VENOUS BLD VENIPUNCTURE: CPT | Performed by: PHYSICIAN ASSISTANT

## 2018-07-02 PROCEDURE — 84439 ASSAY OF FREE THYROXINE: CPT | Performed by: PHYSICIAN ASSISTANT

## 2018-07-03 ENCOUNTER — TELEPHONE (OUTPATIENT)
Dept: ENDOCRINOLOGY | Facility: CLINIC | Age: 28
End: 2018-07-03

## 2018-07-03 NOTE — TELEPHONE ENCOUNTER
Triglycerides eleavated at 248, goal less than 150  Higher than last year  Suggest diet, exercise, weight loss and avoiding alcohol   Will refer to dietician if interested      Pt is aware of results and that thyroid labs were ok

## 2018-10-02 ENCOUNTER — HOSPITAL ENCOUNTER (OUTPATIENT)
Dept: ULTRASOUND IMAGING | Facility: HOSPITAL | Age: 28
Discharge: HOME | End: 2018-10-02
Payer: COMMERCIAL

## 2018-10-02 DIAGNOSIS — E03.9 HYPOTHYROIDISM, UNSPECIFIED TYPE: ICD-10-CM

## 2018-10-02 DIAGNOSIS — E04.1 THYROID NODULE: ICD-10-CM

## 2018-10-02 PROCEDURE — 76536 US EXAM OF HEAD AND NECK: CPT

## 2018-10-04 ENCOUNTER — LAB (OUTPATIENT)
Dept: LAB | Facility: MEDICAL CENTER | Age: 28
End: 2018-10-04
Payer: COMMERCIAL

## 2018-10-04 ENCOUNTER — OFFICE VISIT (OUTPATIENT)
Dept: URGENT CARE | Facility: MEDICAL CENTER | Age: 28
End: 2018-10-04
Payer: COMMERCIAL

## 2018-10-04 ENCOUNTER — TELEPHONE (OUTPATIENT)
Dept: ENDOCRINOLOGY | Facility: CLINIC | Age: 28
End: 2018-10-04

## 2018-10-04 VITALS
HEART RATE: 110 BPM | BODY MASS INDEX: 30.21 KG/M2 | HEIGHT: 65 IN | OXYGEN SATURATION: 99 % | DIASTOLIC BLOOD PRESSURE: 72 MMHG | TEMPERATURE: 98.7 F | RESPIRATION RATE: 18 BRPM | SYSTOLIC BLOOD PRESSURE: 110 MMHG | WEIGHT: 181.3 LBS

## 2018-10-04 DIAGNOSIS — E03.9 HYPOTHYROIDISM, UNSPECIFIED TYPE: Primary | ICD-10-CM

## 2018-10-04 DIAGNOSIS — F41.9 ANXIETY: Primary | ICD-10-CM

## 2018-10-04 DIAGNOSIS — E06.3 AUTOIMMUNE THYROIDITIS: ICD-10-CM

## 2018-10-04 DIAGNOSIS — E03.9 HYPOTHYROIDISM, UNSPECIFIED TYPE: ICD-10-CM

## 2018-10-04 DIAGNOSIS — E03.9 HYPOTHYROIDISM: ICD-10-CM

## 2018-10-04 LAB
T4 FREE SERPL-MCNC: 1.2 NG/DL (ref 0.76–1.46)
TSH SERPL DL<=0.05 MIU/L-ACNC: 2.29 UIU/ML (ref 0.36–3.74)

## 2018-10-04 PROCEDURE — 36415 COLL VENOUS BLD VENIPUNCTURE: CPT

## 2018-10-04 PROCEDURE — 84439 ASSAY OF FREE THYROXINE: CPT

## 2018-10-04 PROCEDURE — 84443 ASSAY THYROID STIM HORMONE: CPT

## 2018-10-04 PROCEDURE — G0382 LEV 3 HOSP TYPE B ED VISIT: HCPCS | Performed by: FAMILY MEDICINE

## 2018-10-04 PROCEDURE — S9083 URGENT CARE CENTER GLOBAL: HCPCS | Performed by: FAMILY MEDICINE

## 2018-10-04 RX ORDER — LEVOTHYROXINE SODIUM 0.05 MG/1
TABLET ORAL
COMMUNITY
Start: 2018-04-02 | End: 2018-10-08 | Stop reason: SDUPTHER

## 2018-10-04 RX ORDER — ASPIRIN 325 MG
TABLET ORAL
COMMUNITY

## 2018-10-04 NOTE — TELEPHONE ENCOUNTER
Called pt and informed that as soon as we get her results we can call her with adjustments  Patient verbal understanding

## 2018-10-04 NOTE — TELEPHONE ENCOUNTER
Pt called lm stating that she believes her medication dosage is too high  She said that for about 7 days now she has been feeling nervous, anxious, has been having rapid heartbeat, feling sweaty, no appetite, and cannot sleep  She said that she has not taken her dosage for today, she does have an appt for Monday but would like something done about this today  Please advise

## 2018-10-04 NOTE — TELEPHONE ENCOUNTER
It looks like she had labs drawn today-will have to wait and see what those labs show to determine if she needs any adjustment in her dose

## 2018-10-05 ENCOUNTER — TELEPHONE (OUTPATIENT)
Dept: ENDOCRINOLOGY | Facility: CLINIC | Age: 28
End: 2018-10-05

## 2018-10-05 NOTE — PROGRESS NOTES
[facility]        NAME: Kareem Armstrong is a 32 y o  female  : 1990    MRN: [id number]  DATE: 2018  TIME: 10:05 PM    Assessment and Plan   Anxiety [F41 9]  1  Anxiety           Patient Instructions       Follow up with PCP in 3-5 days  Proceed to  ER if symptoms worsen  Chief Complaint     Chief Complaint   Patient presents with    Panic Attack     ; had bloodwork drawn today for her Endocrinologist; also states has not slept> 1 hour x 7 days; recently"felt good" and stopped her anxiety meds 2 months ago  History of Present Illness       Patient is here today because she has slept less than an hour over the last 7 days  Denies any previous history of insomnia  However today she fell symptoms of anxiety possible pulmonic attack  Complaining of strong heart palpitation  Also complaining of occasional sweats and tremulousness  Upon further questioning she describes a previous history of depression in the past   She informs me that she stopped taking her Zoloft medication for depression approximately 7 months ago which she was feeling better  At the present time denies any symptoms of depression or suicidal or homicidal ideation  She expresses some concern but she was recently had blood test done for thyroid function  She has a known history of thyroid nodule  Recently had a thyroid ultrasound several days ago however does not know the result  Review of Systems   Review of Systems   Constitutional: Negative  Cardiovascular: Positive for palpitations  Psychiatric/Behavioral: The patient is nervous/anxious            Current Medications       Current Outpatient Prescriptions:     cyclobenzaprine (FLEXERIL) 5 mg tablet, Take 1 tablet by mouth as needed, Disp: , Rfl:     drospirenone-ethinyl estradiol (OCELLA) 3-0 03 MG per tablet, Take 1 tablet by mouth daily, Disp: , Rfl:     levothyroxine (SYNTHROID) 50 mcg tablet, , Disp: , Rfl:     Multiple Vitamins-Minerals (MULTIVITAMIN GUMMIES ADULT) CHEW, Chew, Disp: , Rfl:     sertraline (ZOLOFT) 50 mg tablet, Take 1 tablet by mouth daily, Disp: , Rfl:     Current Allergies     Allergies as of 10/04/2018 - Reviewed 10/04/2018   Allergen Reaction Noted    Tussin [guaifenesin] Hives 10/24/2016            The following portions of the patient's history were reviewed and updated as appropriate: allergies, current medications, past family history, past medical history, past social history, past surgical history and problem list      No past medical history on file  Past Surgical History:   Procedure Laterality Date    NOSE SURGERY      ROTATOR CUFF REPAIR Right     TONSILLECTOMY         Family History   Problem Relation Age of Onset    Thyroid disease unspecified Sister     Thyroid disease unspecified Maternal Aunt     Breast cancer Maternal Grandmother     Thyroid disease unspecified Maternal Grandmother     Diabetes unspecified Maternal Grandfather          Medications have been verified  Objective   /72   Pulse (!) 110   Temp 98 7 °F (37 1 °C)   Resp 18   Ht 5' 5" (1 651 m)   Wt 82 2 kg (181 lb 4 8 oz)   SpO2 99%   BMI 30 17 kg/m²        Physical Exam     Physical Exam   Cardiovascular: Normal rate, regular rhythm and normal heart sounds  Pulmonary/Chest: Effort normal and breath sounds normal    Psychiatric: She has a normal mood and affect  Anxious  Nursing note and vitals reviewed

## 2018-10-05 NOTE — TELEPHONE ENCOUNTER
----- Message from Mikhail Beaver MD sent at 10/5/2018  8:00 AM EDT -----  Please call the patient regarding labs - tsh normal , continue current dose

## 2018-10-05 NOTE — PATIENT INSTRUCTIONS
History and physical exam suggests anxiety possibly panic attack which is contributing to insomnia  Patient given reassurance  However, for insomnia I recommended she consider taking melatonin prior to bedtime  She should discuss symptoms of anxiety with her primary care provider  She expressed understanding  Anxiety   WHAT YOU NEED TO KNOW:   Anxiety is a condition that causes you to feel extremely worried or nervous  The feelings are so strong that they can cause problems with your daily activities or sleep  Anxiety may be triggered by something you fear, or it may happen without a cause  Family or work stress, smoking, caffeine, and alcohol can increase your risk for anxiety  Certain medicines or health conditions can also increase your risk  Anxiety can become a long-term condition if it is not managed or treated  DISCHARGE INSTRUCTIONS:   Call 911 if:   · You have chest pain, tightness, or heaviness that may spread to your shoulders, arms, jaw, neck, or back  · You feel like hurting yourself or someone else  Contact your healthcare provider if:   · Your symptoms get worse or do not get better with treatment  · Your anxiety keeps you from doing your regular daily activities  · You have new symptoms since your last visit  · You have questions or concerns about your condition or care  Medicines:   · Medicines  may be given to help you feel more calm and relaxed, and decrease your symptoms  · Take your medicine as directed  Contact your healthcare provider if you think your medicine is not helping or if you have side effects  Tell him of her if you are allergic to any medicine  Keep a list of the medicines, vitamins, and herbs you take  Include the amounts, and when and why you take them  Bring the list or the pill bottles to follow-up visits  Carry your medicine list with you in case of an emergency    Follow up with your healthcare provider within 2 weeks or as directed:  Write down your questions so you remember to ask them during your visits  Manage anxiety:   · Talk to someone about your anxiety  Your healthcare provider may suggest counseling  Cognitive behavioral therapy can help you understand and change how you react to events that trigger your symptoms  You might feel more comfortable talking with a friend or family member about your anxiety  Choose someone you know will be supportive and encouraging  · Find ways to relax  Activities such as exercise, meditation, or listening to music can help you relax  Spend time with friends, or do things you enjoy  · Practice deep breathing  Deep breathing can help you relax when you feel anxious  Focus on taking slow, deep breaths several times a day, or during an anxiety attack  Breathe in through your nose and out through your mouth  · Create a regular sleep routine  Regular sleep can help you feel calmer during the day  Go to sleep and wake up at the same times every day  Do not watch television or use the computer right before bed  Your room should be comfortable, dark, and quiet  · Eat a variety of healthy foods  Healthy foods include fruits, vegetables, low-fat dairy products, lean meats, fish, whole-grain breads, and cooked beans  Healthy foods can help you feel less anxious and have more energy  · Exercise regularly  Exercise can increase your energy level  Exercise may also lift your mood and help you sleep better  Your healthcare provider can help you create an exercise plan  · Do not smoke  Nicotine and other chemicals in cigarettes and cigars can increase anxiety  Ask your healthcare provider for information if you currently smoke and need help to quit  E-cigarettes or smokeless tobacco still contain nicotine  Talk to your healthcare provider before you use these products  · Do not have caffeine  Caffeine can make your symptoms worse   Do not have foods or drinks that are meant to increase your energy level     · Limit or do not drink alcohol  Ask your healthcare provider if alcohol is safe for you  You may not be able to drink alcohol if you take certain anxiety or depression medicines  Limit alcohol to 1 drink per day if you are a woman  Limit alcohol to 2 drinks per day if you are a man  A drink of alcohol is 12 ounces of beer, 5 ounces of wine, or 1½ ounces of liquor  · Do not use drugs  Drugs can make your anxiety worse  It can also make anxiety hard to manage  Talk to your healthcare provider if you use drugs and want help to quit  © 2017 [employer] Information is for End User's use only and may not be sold, redistributed or otherwise used for commercial purposes  All illustrations and images included in CareNotes® are the copyrighted property of [employer]  or [employer]  The above information is an  only  It is not intended as medical advice for individual conditions or treatments  Talk to your doctor, nurse or pharmacist before following any medical regimen to see if it is safe and effective for you

## 2018-10-08 ENCOUNTER — OFFICE VISIT (OUTPATIENT)
Dept: ENDOCRINOLOGY | Facility: CLINIC | Age: 28
End: 2018-10-08
Payer: COMMERCIAL

## 2018-10-08 VITALS
HEIGHT: 65 IN | BODY MASS INDEX: 30.16 KG/M2 | SYSTOLIC BLOOD PRESSURE: 120 MMHG | WEIGHT: 181 LBS | HEART RATE: 81 BPM | DIASTOLIC BLOOD PRESSURE: 82 MMHG

## 2018-10-08 DIAGNOSIS — E04.1 THYROID NODULE: ICD-10-CM

## 2018-10-08 DIAGNOSIS — E03.8 HYPOTHYROIDISM DUE TO HASHIMOTO'S THYROIDITIS: Primary | ICD-10-CM

## 2018-10-08 DIAGNOSIS — E06.3 HASHIMOTO'S THYROIDITIS: ICD-10-CM

## 2018-10-08 DIAGNOSIS — E06.3 HYPOTHYROIDISM DUE TO HASHIMOTO'S THYROIDITIS: Primary | ICD-10-CM

## 2018-10-08 PROCEDURE — 99214 OFFICE O/P EST MOD 30 MIN: CPT | Performed by: INTERNAL MEDICINE

## 2018-10-08 RX ORDER — LEVOTHYROXINE SODIUM 0.05 MG/1
50 TABLET ORAL DAILY
Qty: 90 TABLET | Refills: 3 | Status: SHIPPED | OUTPATIENT
Start: 2018-10-08 | End: 2019-01-23 | Stop reason: DRUGHIGH

## 2018-10-08 RX ORDER — BUSPIRONE HYDROCHLORIDE 5 MG/1
5 TABLET ORAL 2 TIMES DAILY
COMMUNITY
Start: 2018-10-05 | End: 2019-04-08 | Stop reason: ALTCHOICE

## 2018-10-08 NOTE — PROGRESS NOTES
Alia Cross 32 y o  female MRN: [id number]    Encounter: [id number]      Assessment/Plan     Problem List Items Addressed This Visit     Hashimoto's thyroiditis    Relevant Medications    levothyroxine (SYNTHROID) 50 mcg tablet    Hypothyroidism - Primary     TSH is optimal-symptoms not likely related to thyroid dysfunction  For now continue levothyroxine at current dose and repeat thyroid function test in the next couple of months  I have advised her to discontinue her supplementation with iodine         Relevant Medications    levothyroxine (SYNTHROID) 50 mcg tablet    Other Relevant Orders    T4, free Lab Collect    TSH, 3rd generation Lab Collect    Thyroid nodule     Does not meet criteria for fine-needle aspiration biopsy-will continue to monitor and repeat a thyroid ultrasound next year  Relevant Medications    levothyroxine (SYNTHROID) 50 mcg tablet        CC:    hypothyroidism     History of Present Illness      HPI:  57-year-old woman with history of hypothyroidism secondary to Hashimoto thyroiditis and thyroid nodule here for follow-up  For hypothyroidism she is currently on levothyroxine 50 mcg a day and has been taking it regularly and properly  For the past week or so she has been having Panic attacks - and has been started on BuSpar by primary care  Complains of Sleep disturbances  Lost 5 lbs in 8 days   Nauseous , BM every few days but loose   Heat intolerance and night sweats   Feels heart racing  , jittery and anxious     History of thyroid nodules-denies any obstructive symptoms including difficulty swallowing breathing or pressure sensations  No family history of thyroid cancer, no history of radiation to her neck  Review of Systems   Constitutional: Positive for fatigue and unexpected weight change  Eyes: Negative for visual disturbance  Respiratory: Negative for cough and shortness of breath  Cardiovascular: Negative for leg swelling     Gastrointestinal: Positive for diarrhea and nausea  Musculoskeletal: Negative for gait problem  Psychiatric/Behavioral: Positive for sleep disturbance  The patient is nervous/anxious  All other systems reviewed and are negative  Historical Information   History reviewed  No pertinent past medical history  Past Surgical History:   Procedure Laterality Date    NOSE SURGERY      ROTATOR CUFF REPAIR Right     TONSILLECTOMY       Social History   History   Alcohol Use    Yes     History   Drug Use No     History   Smoking Status    Never Smoker   Smokeless Tobacco    Never Used     Family History:   Family History   Problem Relation Age of Onset    Thyroid disease unspecified Sister     Thyroid disease unspecified Maternal Aunt     Breast cancer Maternal Grandmother     Thyroid disease unspecified Maternal Grandmother     Diabetes unspecified Maternal Grandfather        Meds/Allergies   Current Outpatient Prescriptions   Medication Sig Dispense Refill    busPIRone (BUSPAR) 5 mg tablet Take 5 mg by mouth 2 (two) times a day      cyclobenzaprine (FLEXERIL) 5 mg tablet Take 1 tablet by mouth as needed      drospirenone-ethinyl estradiol (OCELLA) 3-0 03 MG per tablet Take 1 tablet by mouth daily      levothyroxine (SYNTHROID) 50 mcg tablet Take 1 tablet (50 mcg total) by mouth daily 90 tablet 3    Multiple Vitamins-Minerals (MULTIVITAMIN GUMMIES ADULT) CHEW Chew      sertraline (ZOLOFT) 50 mg tablet Take 1 tablet by mouth daily       No current facility-administered medications for this visit  Allergies   Allergen Reactions    Tussin [Guaifenesin] Hives     Tussi-12 reaction in childhood (hives)       Objective   Vitals: Blood pressure 120/82, pulse 81, height 5' 5" (1 651 m), weight 82 1 kg (181 lb)  Physical Exam   Constitutional: She is oriented to person, place, and time  She appears well-developed and well-nourished  No distress  HENT:   Head: Normocephalic and atraumatic     Mouth/Throat: No oropharyngeal exudate  Eyes: Conjunctivae and EOM are normal  No scleral icterus  Neck: Normal range of motion  Neck supple  Cardiovascular: Normal rate, regular rhythm and normal heart sounds  No murmur heard  Pulmonary/Chest: Effort normal  No respiratory distress  She has no wheezes  She has no rales  Abdominal: Soft  Bowel sounds are normal  She exhibits no distension  There is no tenderness  There is no rebound  Musculoskeletal: Normal range of motion  She exhibits no edema or deformity  Lymphadenopathy:     She has no cervical adenopathy  Neurological: She is alert and oriented to person, place, and time  Skin: Skin is warm and dry  No rash noted  No erythema  No pallor  The history was obtained from the review of the chart, patient  Lab Results:   Lab Results   Component Value Date/Time    TSH 3RD GENERATON 2 290 10/04/2018 01:16 PM    TSH 3RD GENERATON 1 790 07/02/2018 07:37 AM    TSH 3RD GENERATON 0 810 11/08/2017 07:15 AM    Free T4 1 20 10/04/2018 01:16 PM    Free T4 1 07 07/02/2018 07:37 AM    Free T4 1 19 11/08/2017 07:15 AM     Imaging Studies:     Study Result     THYROID ULTRASOUND     INDICATION:    E04 1: Nontoxic single thyroid nodule  E03 9: Hypothyroidism, unspecified  Hashimoto's thyroiditis        COMPARISON:  Thyroid ultrasound from October 17, 2017      TECHNIQUE:   Ultrasound of the thyroid was performed with a high frequency linear transducer in transverse and sagittal planes including volumetric imaging sweeps as well as traditional still imaging technique      FINDINGS:  Thyroid parenchyma is diffusely heterogeneous in echotexture      Right lobe:  5 1 x 1 5 x 1 4 cm  Left lobe:  5 4 x 1 3 x 1 3 cm  Isthmus:  0 25 cm      Nodule #1  Isthmus thyroid nodule measuring 1 2 x 0 3 x 0 7 cm  This nodule has mildly increased in size from prior ultrasound on October 17, 2017 previously measuring 0 9 x 0 3 x 0 7 cm  Image #[id number]    COMPOSITION:  2 points, solid or almost completely solid   ECHOGENICITY:  2 points, hypoechoic  SHAPE:  0 points, wider-than-tall  MARGIN: 0 points, smooth  ECHOGENIC FOCI:  0 points, none or large comet-tail artifacts  TI-RADS Classification: TR 4 (4-6 points), Moderately suspicious  FNA if > 1 5 cm  Follow if > 1cm           IMPRESSION:     Heterogeneous thyroid gland with a small isthmus thyroid nodule which has mildly increased in size since prior ultrasound in 2017  This nodule still does not meet current ACR criteria for biopsy  Follow-up ultrasound is recommended in one year               I have personally reviewed pertinent reports  Portions of the record may have been created with voice recognition software  Occasional wrong word or "sound a like" substitutions may have occurred due to the inherent limitations of voice recognition software  Read the chart carefully and recognize, using context, where substitutions have occurred

## 2018-10-08 NOTE — LETTER
October 11, 2018     [address] [place] [address]    Patient: Ivis Good   YOB: 1990   Date of Visit: 10/8/2018       Dear Dr Riojas Code: Thank you for referring Shruthi Faye to me for evaluation  Below are my notes for this consultation  If you have questions, please do not hesitate to call me  I look forward to following your patient along with you  Sincerely,        Landy Norris MD        CC: No Recipients  Landy Norris MD  10/8/2018 10:05 AM  Sign at close encounter   Ivis Good 32 y o  female MRN: [id number]    Encounter: [id number]      Assessment/Plan     Problem List Items Addressed This Visit     Hashimoto's thyroiditis    Relevant Medications    levothyroxine (SYNTHROID) 50 mcg tablet    Hypothyroidism - Primary     TSH is optimal-symptoms not likely related to thyroid dysfunction  For now continue levothyroxine at current dose and repeat thyroid function test in the next couple of months  I have advised her to discontinue her supplementation with iodine         Relevant Medications    levothyroxine (SYNTHROID) 50 mcg tablet    Other Relevant Orders    T4, free Lab Collect    TSH, 3rd generation Lab Collect    Thyroid nodule     Does not meet criteria for fine-needle aspiration biopsy-will continue to monitor and repeat a thyroid ultrasound next year  Relevant Medications    levothyroxine (SYNTHROID) 50 mcg tablet        CC:    hypothyroidism     History of Present Illness      HPI:  29-year-old woman with history of hypothyroidism secondary to Hashimoto thyroiditis and thyroid nodule here for follow-up  For hypothyroidism she is currently on levothyroxine 50 mcg a day and has been taking it regularly and properly    For the past week or so she has been having Panic attacks - and has been started on BuSpar by primary care  Complains of Sleep disturbances  Lost 5 lbs in 8 days   Nauseous , BM every few days but loose    Heat intolerance and night sweats   Feels heart racing  , jittery and anxious     History of thyroid nodules-denies any obstructive symptoms including difficulty swallowing breathing or pressure sensations  No family history of thyroid cancer, no history of radiation to her neck  Review of Systems   Constitutional: Positive for fatigue and unexpected weight change  Eyes: Negative for visual disturbance  Respiratory: Negative for cough and shortness of breath  Cardiovascular: Negative for leg swelling  Gastrointestinal: Positive for diarrhea and nausea  Musculoskeletal: Negative for gait problem  Psychiatric/Behavioral: Positive for sleep disturbance  The patient is nervous/anxious  All other systems reviewed and are negative  Historical Information   History reviewed  No pertinent past medical history    Past Surgical History:   Procedure Laterality Date    NOSE SURGERY      ROTATOR CUFF REPAIR Right     TONSILLECTOMY       Social History   History   Alcohol Use    Yes     History   Drug Use No     History   Smoking Status    Never Smoker   Smokeless Tobacco    Never Used     Family History:   Family History   Problem Relation Age of Onset    Thyroid disease unspecified Sister     Thyroid disease unspecified Maternal Aunt     Breast cancer Maternal Grandmother     Thyroid disease unspecified Maternal Grandmother     Diabetes unspecified Maternal Grandfather        Meds/Allergies   Current Outpatient Prescriptions   Medication Sig Dispense Refill    busPIRone (BUSPAR) 5 mg tablet Take 5 mg by mouth 2 (two) times a day      cyclobenzaprine (FLEXERIL) 5 mg tablet Take 1 tablet by mouth as needed      drospirenone-ethinyl estradiol (OCELLA) 3-0 03 MG per tablet Take 1 tablet by mouth daily      levothyroxine (SYNTHROID) 50 mcg tablet Take 1 tablet (50 mcg total) by mouth daily 90 tablet 3    Multiple Vitamins-Minerals (MULTIVITAMIN GUMMIES ADULT) CHEW Chew      sertraline (ZOLOFT) 50 mg tablet Take 1 tablet by mouth daily       No current facility-administered medications for this visit  Allergies   Allergen Reactions    Tussin [Guaifenesin] Hives     Tussi-12 reaction in childhood (hives)       Objective   Vitals: Blood pressure 120/82, pulse 81, height 5' 5" (1 651 m), weight 82 1 kg (181 lb)  Physical Exam   Constitutional: She is oriented to person, place, and time  She appears well-developed and well-nourished  No distress  HENT:   Head: Normocephalic and atraumatic  Mouth/Throat: No oropharyngeal exudate  Eyes: Conjunctivae and EOM are normal  No scleral icterus  Neck: Normal range of motion  Neck supple  Cardiovascular: Normal rate, regular rhythm and normal heart sounds  No murmur heard  Pulmonary/Chest: Effort normal  No respiratory distress  She has no wheezes  She has no rales  Abdominal: Soft  Bowel sounds are normal  She exhibits no distension  There is no tenderness  There is no rebound  Musculoskeletal: Normal range of motion  She exhibits no edema or deformity  Lymphadenopathy:     She has no cervical adenopathy  Neurological: She is alert and oriented to person, place, and time  Skin: Skin is warm and dry  No rash noted  No erythema  No pallor  The history was obtained from the review of the chart, patient  Lab Results:   Lab Results   Component Value Date/Time    TSH 3RD GENERATON 2 290 10/04/2018 01:16 PM    TSH 3RD GENERATON 1 790 07/02/2018 07:37 AM    TSH 3RD GENERATON 0 810 11/08/2017 07:15 AM    Free T4 1 20 10/04/2018 01:16 PM    Free T4 1 07 07/02/2018 07:37 AM    Free T4 1 19 11/08/2017 07:15 AM     Imaging Studies:     Study Result     THYROID ULTRASOUND     INDICATION:    E04 1: Nontoxic single thyroid nodule  E03 9: Hypothyroidism, unspecified      Hashimoto's thyroiditis        COMPARISON:  Thyroid ultrasound from October 17, 2017      TECHNIQUE:   Ultrasound of the thyroid was performed with a high frequency linear transducer in transverse and sagittal planes including volumetric imaging sweeps as well as traditional still imaging technique      FINDINGS:  Thyroid parenchyma is diffusely heterogeneous in echotexture      Right lobe:  5 1 x 1 5 x 1 4 cm  Left lobe:  5 4 x 1 3 x 1 3 cm  Isthmus:  0 25 cm      Nodule #1  Isthmus thyroid nodule measuring 1 2 x 0 3 x 0 7 cm  This nodule has mildly increased in size from prior ultrasound on October 17, 2017 previously measuring 0 9 x 0 3 x 0 7 cm  Image #[id number]  COMPOSITION:  2 points, solid or almost completely solid   ECHOGENICITY:  2 points, hypoechoic  SHAPE:  0 points, wider-than-tall  MARGIN: 0 points, smooth  ECHOGENIC FOCI:  0 points, none or large comet-tail artifacts  TI-RADS Classification: TR 4 (4-6 points), Moderately suspicious  FNA if > 1 5 cm  Follow if > 1cm           IMPRESSION:     Heterogeneous thyroid gland with a small isthmus thyroid nodule which has mildly increased in size since prior ultrasound in 2017  This nodule still does not meet current ACR criteria for biopsy  Follow-up ultrasound is recommended in one year               I have personally reviewed pertinent reports  Portions of the record may have been created with voice recognition software  Occasional wrong word or "sound a like" substitutions may have occurred due to the inherent limitations of voice recognition software  Read the chart carefully and recognize, using context, where substitutions have occurred

## 2018-10-08 NOTE — ASSESSMENT & PLAN NOTE
TSH is optimal-symptoms not likely related to thyroid dysfunction  For now continue levothyroxine at current dose and repeat thyroid function test in the next couple of months    I have advised her to discontinue her supplementation with iodine

## 2018-10-08 NOTE — ASSESSMENT & PLAN NOTE
Does not meet criteria for fine-needle aspiration biopsy-will continue to monitor and repeat a thyroid ultrasound next year

## 2019-01-21 ENCOUNTER — LAB (OUTPATIENT)
Dept: LAB | Facility: CLINIC | Age: 29
End: 2019-01-21
Payer: COMMERCIAL

## 2019-01-21 DIAGNOSIS — E06.3 HYPOTHYROIDISM DUE TO HASHIMOTO'S THYROIDITIS: ICD-10-CM

## 2019-01-21 DIAGNOSIS — E03.8 HYPOTHYROIDISM DUE TO HASHIMOTO'S THYROIDITIS: ICD-10-CM

## 2019-01-21 LAB
T4 FREE SERPL-MCNC: 1.01 NG/DL (ref 0.76–1.46)
TSH SERPL DL<=0.05 MIU/L-ACNC: 4.49 UIU/ML (ref 0.36–3.74)

## 2019-01-21 PROCEDURE — 36415 COLL VENOUS BLD VENIPUNCTURE: CPT

## 2019-01-21 PROCEDURE — 84439 ASSAY OF FREE THYROXINE: CPT

## 2019-01-21 PROCEDURE — 84443 ASSAY THYROID STIM HORMONE: CPT

## 2019-01-23 ENCOUNTER — TELEPHONE (OUTPATIENT)
Dept: ENDOCRINOLOGY | Facility: CLINIC | Age: 29
End: 2019-01-23

## 2019-01-23 DIAGNOSIS — E06.3 HYPOTHYROIDISM DUE TO HASHIMOTO'S THYROIDITIS: Primary | ICD-10-CM

## 2019-01-23 DIAGNOSIS — E03.8 HYPOTHYROIDISM DUE TO HASHIMOTO'S THYROIDITIS: Primary | ICD-10-CM

## 2019-01-23 RX ORDER — LEVOTHYROXINE SODIUM 50 MCG
50 TABLET ORAL DAILY
COMMUNITY
End: 2019-11-10 | Stop reason: SDUPTHER

## 2019-01-23 NOTE — PROGRESS NOTES
Please call the patient regarding labs -  tsh slightly high - increase synthroid to 1 tab mon-sat and 2 on Sunday ( please confirm that she is taking 1 tab daily ) repeat tsh and free t4 prior to next visit

## 2019-01-23 NOTE — TELEPHONE ENCOUNTER
----- Message from Gloria Edmonds MD sent at 1/22/2019  9:13 PM EST -----  Please call the patient regarding labs -  tsh slightly high - increase synthroid to 1 tab mon-sat and 2 on Sunday ( please confirm that she is taking 1 tab daily ) repeat tsh and free t4 prior to next visit

## 2019-04-02 ENCOUNTER — APPOINTMENT (OUTPATIENT)
Dept: LAB | Facility: CLINIC | Age: 29
End: 2019-04-02
Payer: COMMERCIAL

## 2019-04-02 DIAGNOSIS — E03.8 HYPOTHYROIDISM DUE TO HASHIMOTO'S THYROIDITIS: ICD-10-CM

## 2019-04-02 DIAGNOSIS — E06.3 HYPOTHYROIDISM DUE TO HASHIMOTO'S THYROIDITIS: ICD-10-CM

## 2019-04-02 LAB
T4 FREE SERPL-MCNC: 1.27 NG/DL (ref 0.76–1.46)
TSH SERPL DL<=0.05 MIU/L-ACNC: 1.75 UIU/ML (ref 0.36–3.74)

## 2019-04-02 PROCEDURE — 84439 ASSAY OF FREE THYROXINE: CPT

## 2019-04-02 PROCEDURE — 84443 ASSAY THYROID STIM HORMONE: CPT

## 2019-04-02 PROCEDURE — 36415 COLL VENOUS BLD VENIPUNCTURE: CPT

## 2019-04-08 ENCOUNTER — OFFICE VISIT (OUTPATIENT)
Dept: ENDOCRINOLOGY | Facility: CLINIC | Age: 29
End: 2019-04-08
Payer: COMMERCIAL

## 2019-04-08 VITALS
SYSTOLIC BLOOD PRESSURE: 118 MMHG | WEIGHT: 196 LBS | HEART RATE: 90 BPM | DIASTOLIC BLOOD PRESSURE: 80 MMHG | HEIGHT: 65 IN | BODY MASS INDEX: 32.65 KG/M2

## 2019-04-08 DIAGNOSIS — E78.5 HYPERLIPIDEMIA, UNSPECIFIED HYPERLIPIDEMIA TYPE: ICD-10-CM

## 2019-04-08 DIAGNOSIS — E04.1 THYROID NODULE: ICD-10-CM

## 2019-04-08 DIAGNOSIS — E06.3 HASHIMOTO'S THYROIDITIS: Primary | ICD-10-CM

## 2019-04-08 DIAGNOSIS — E03.8 HYPOTHYROIDISM DUE TO HASHIMOTO'S THYROIDITIS: ICD-10-CM

## 2019-04-08 DIAGNOSIS — E66.09 CLASS 1 OBESITY DUE TO EXCESS CALORIES WITH SERIOUS COMORBIDITY AND BODY MASS INDEX (BMI) OF 32.0 TO 32.9 IN ADULT: ICD-10-CM

## 2019-04-08 DIAGNOSIS — E06.3 HYPOTHYROIDISM DUE TO HASHIMOTO'S THYROIDITIS: ICD-10-CM

## 2019-04-08 DIAGNOSIS — R79.89 ELEVATED TSH: ICD-10-CM

## 2019-04-08 PROCEDURE — 99214 OFFICE O/P EST MOD 30 MIN: CPT | Performed by: PHYSICIAN ASSISTANT

## 2019-04-15 ENCOUNTER — OFFICE VISIT (OUTPATIENT)
Dept: DIABETES SERVICES | Facility: CLINIC | Age: 29
End: 2019-04-15
Payer: COMMERCIAL

## 2019-04-15 VITALS — BODY MASS INDEX: 32.45 KG/M2 | WEIGHT: 195 LBS

## 2019-04-15 DIAGNOSIS — E66.09 CLASS 1 OBESITY DUE TO EXCESS CALORIES WITH SERIOUS COMORBIDITY AND BODY MASS INDEX (BMI) OF 32.0 TO 32.9 IN ADULT: ICD-10-CM

## 2019-04-15 DIAGNOSIS — E78.5 HYPERLIPIDEMIA, UNSPECIFIED HYPERLIPIDEMIA TYPE: Primary | ICD-10-CM

## 2019-04-15 PROCEDURE — 97802 MEDICAL NUTRITION INDIV IN: CPT | Performed by: DIETITIAN, REGISTERED

## 2019-10-11 ENCOUNTER — HOSPITAL ENCOUNTER (OUTPATIENT)
Dept: ULTRASOUND IMAGING | Facility: HOSPITAL | Age: 29
Discharge: HOME | End: 2019-10-11
Payer: COMMERCIAL

## 2019-10-11 DIAGNOSIS — E04.1 THYROID NODULE: ICD-10-CM

## 2019-10-11 PROCEDURE — 76536 US EXAM OF HEAD AND NECK: CPT

## 2019-10-15 ENCOUNTER — TELEPHONE (OUTPATIENT)
Dept: ENDOCRINOLOGY | Facility: CLINIC | Age: 29
End: 2019-10-15

## 2019-10-15 NOTE — TELEPHONE ENCOUNTER
----- Message from Deejay Vasquez PA-C sent at 10/15/2019  1:21 PM EDT -----  Thyroid ultrasound stable-- radiologist reports that what looks liked a nodule on previous ultrasound is more likely a lymph node which is small and stable

## 2019-10-21 ENCOUNTER — APPOINTMENT (OUTPATIENT)
Dept: LAB | Facility: CLINIC | Age: 29
End: 2019-10-21
Payer: COMMERCIAL

## 2019-10-21 DIAGNOSIS — E78.5 HYPERLIPIDEMIA, UNSPECIFIED HYPERLIPIDEMIA TYPE: ICD-10-CM

## 2019-10-21 DIAGNOSIS — E66.09 CLASS 1 OBESITY DUE TO EXCESS CALORIES WITH SERIOUS COMORBIDITY AND BODY MASS INDEX (BMI) OF 32.0 TO 32.9 IN ADULT: ICD-10-CM

## 2019-10-21 DIAGNOSIS — E06.3 HYPOTHYROIDISM DUE TO HASHIMOTO'S THYROIDITIS: ICD-10-CM

## 2019-10-21 DIAGNOSIS — E06.3 HASHIMOTO'S THYROIDITIS: ICD-10-CM

## 2019-10-21 DIAGNOSIS — E03.8 HYPOTHYROIDISM DUE TO HASHIMOTO'S THYROIDITIS: ICD-10-CM

## 2019-10-21 LAB
ALBUMIN SERPL BCP-MCNC: 3.4 G/DL (ref 3.5–5)
ALP SERPL-CCNC: 102 U/L (ref 46–116)
ALT SERPL W P-5'-P-CCNC: 71 U/L (ref 12–78)
ANION GAP SERPL CALCULATED.3IONS-SCNC: 7 MMOL/L (ref 4–13)
AST SERPL W P-5'-P-CCNC: 33 U/L (ref 5–45)
BILIRUB SERPL-MCNC: 0.59 MG/DL (ref 0.2–1)
BUN SERPL-MCNC: 12 MG/DL (ref 5–25)
CALCIUM SERPL-MCNC: 9.1 MG/DL (ref 8.3–10.1)
CHLORIDE SERPL-SCNC: 105 MMOL/L (ref 100–108)
CHOLEST SERPL-MCNC: 214 MG/DL (ref 50–200)
CO2 SERPL-SCNC: 23 MMOL/L (ref 21–32)
CREAT SERPL-MCNC: 0.89 MG/DL (ref 0.6–1.3)
GFR SERPL CREATININE-BSD FRML MDRD: 87 ML/MIN/1.73SQ M
GLUCOSE P FAST SERPL-MCNC: 79 MG/DL (ref 65–99)
HDLC SERPL-MCNC: 77 MG/DL
LDLC SERPL CALC-MCNC: 105 MG/DL (ref 0–100)
POTASSIUM SERPL-SCNC: 3.9 MMOL/L (ref 3.5–5.3)
PROT SERPL-MCNC: 7.2 G/DL (ref 6.4–8.2)
SODIUM SERPL-SCNC: 135 MMOL/L (ref 136–145)
T4 FREE SERPL-MCNC: 1.02 NG/DL (ref 0.76–1.46)
TRIGL SERPL-MCNC: 159 MG/DL
TSH SERPL DL<=0.05 MIU/L-ACNC: 2.57 UIU/ML (ref 0.36–3.74)

## 2019-10-21 PROCEDURE — 80061 LIPID PANEL: CPT

## 2019-10-21 PROCEDURE — 36415 COLL VENOUS BLD VENIPUNCTURE: CPT

## 2019-10-21 PROCEDURE — 84443 ASSAY THYROID STIM HORMONE: CPT

## 2019-10-21 PROCEDURE — 84439 ASSAY OF FREE THYROXINE: CPT

## 2019-10-21 PROCEDURE — 80053 COMPREHEN METABOLIC PANEL: CPT

## 2019-10-22 ENCOUNTER — TELEPHONE (OUTPATIENT)
Dept: ENDOCRINOLOGY | Facility: CLINIC | Age: 29
End: 2019-10-22

## 2019-10-22 NOTE — TELEPHONE ENCOUNTER
----- Message from Columba Zhang PA-C sent at 10/22/2019  9:41 AM EDT -----  Thyroid testing normal continue same medications

## 2019-11-10 DIAGNOSIS — E03.8 OTHER SPECIFIED HYPOTHYROIDISM: Primary | ICD-10-CM

## 2019-11-10 RX ORDER — LEVOTHYROXINE SODIUM 0.05 MG/1
TABLET ORAL
Qty: 90 TABLET | Refills: 4 | Status: SHIPPED | OUTPATIENT
Start: 2019-11-10 | End: 2020-04-17 | Stop reason: SDUPTHER

## 2020-04-09 ENCOUNTER — TRANSCRIBE ORDERS (OUTPATIENT)
Dept: ADMINISTRATIVE | Facility: HOSPITAL | Age: 30
End: 2020-04-09

## 2020-04-09 DIAGNOSIS — E06.3 CHRONIC LYMPHOCYTIC THYROIDITIS: Primary | ICD-10-CM

## 2020-04-09 DIAGNOSIS — E03.8 HYPOTHYROIDISM DUE TO HASHIMOTO'S THYROIDITIS: Primary | ICD-10-CM

## 2020-04-09 DIAGNOSIS — E06.3 HYPOTHYROIDISM DUE TO HASHIMOTO'S THYROIDITIS: Primary | ICD-10-CM

## 2020-04-13 ENCOUNTER — APPOINTMENT (OUTPATIENT)
Dept: LAB | Facility: HOSPITAL | Age: 30
End: 2020-04-13
Payer: COMMERCIAL

## 2020-04-13 DIAGNOSIS — E06.3 HYPOTHYROIDISM DUE TO HASHIMOTO'S THYROIDITIS: ICD-10-CM

## 2020-04-13 DIAGNOSIS — E03.8 HYPOTHYROIDISM DUE TO HASHIMOTO'S THYROIDITIS: ICD-10-CM

## 2020-04-13 LAB
T4 FREE SERPL-MCNC: 1.16 NG/DL (ref 0.76–1.46)
TSH SERPL DL<=0.05 MIU/L-ACNC: 4 UIU/ML (ref 0.36–3.74)

## 2020-04-13 PROCEDURE — 84443 ASSAY THYROID STIM HORMONE: CPT

## 2020-04-13 PROCEDURE — 84439 ASSAY OF FREE THYROXINE: CPT

## 2020-04-13 PROCEDURE — 36415 COLL VENOUS BLD VENIPUNCTURE: CPT

## 2020-04-17 ENCOUNTER — TELEMEDICINE (OUTPATIENT)
Dept: ENDOCRINOLOGY | Facility: CLINIC | Age: 30
End: 2020-04-17
Payer: COMMERCIAL

## 2020-04-17 DIAGNOSIS — E03.8 OTHER SPECIFIED HYPOTHYROIDISM: ICD-10-CM

## 2020-04-17 DIAGNOSIS — E03.8 HYPOTHYROIDISM DUE TO HASHIMOTO'S THYROIDITIS: Primary | ICD-10-CM

## 2020-04-17 DIAGNOSIS — E06.3 HYPOTHYROIDISM DUE TO HASHIMOTO'S THYROIDITIS: Primary | ICD-10-CM

## 2020-04-17 DIAGNOSIS — E78.5 HYPERLIPIDEMIA, UNSPECIFIED HYPERLIPIDEMIA TYPE: ICD-10-CM

## 2020-04-17 DIAGNOSIS — E04.1 THYROID NODULE: ICD-10-CM

## 2020-04-17 DIAGNOSIS — E06.3 HASHIMOTO'S THYROIDITIS: ICD-10-CM

## 2020-04-17 PROCEDURE — 99214 OFFICE O/P EST MOD 30 MIN: CPT | Performed by: INTERNAL MEDICINE

## 2020-04-17 RX ORDER — LEVOTHYROXINE SODIUM 0.05 MG/1
TABLET ORAL
Qty: 120 TABLET | Refills: 4 | Status: SHIPPED | OUTPATIENT
Start: 2020-04-17 | End: 2021-05-10 | Stop reason: SDUPTHER

## 2020-06-28 ENCOUNTER — LAB (OUTPATIENT)
Dept: LAB | Facility: HOSPITAL | Age: 30
End: 2020-06-28
Attending: INTERNAL MEDICINE
Payer: COMMERCIAL

## 2020-06-28 DIAGNOSIS — E06.3 HYPOTHYROIDISM DUE TO HASHIMOTO'S THYROIDITIS: ICD-10-CM

## 2020-06-28 DIAGNOSIS — E78.5 HYPERLIPIDEMIA, UNSPECIFIED HYPERLIPIDEMIA TYPE: ICD-10-CM

## 2020-06-28 DIAGNOSIS — E03.8 HYPOTHYROIDISM DUE TO HASHIMOTO'S THYROIDITIS: ICD-10-CM

## 2020-06-28 LAB
CHOLEST SERPL-MCNC: 227 MG/DL (ref 50–200)
HDLC SERPL-MCNC: 78 MG/DL
LDLC SERPL CALC-MCNC: 113 MG/DL (ref 0–100)
T4 FREE SERPL-MCNC: 1.44 NG/DL (ref 0.76–1.46)
TRIGL SERPL-MCNC: 178 MG/DL
TSH SERPL DL<=0.05 MIU/L-ACNC: 0.92 UIU/ML (ref 0.36–3.74)

## 2020-06-28 PROCEDURE — 84443 ASSAY THYROID STIM HORMONE: CPT

## 2020-06-28 PROCEDURE — 84439 ASSAY OF FREE THYROXINE: CPT

## 2020-06-28 PROCEDURE — 36415 COLL VENOUS BLD VENIPUNCTURE: CPT

## 2020-06-28 PROCEDURE — 80061 LIPID PANEL: CPT

## 2020-06-29 ENCOUNTER — TELEPHONE (OUTPATIENT)
Dept: ENDOCRINOLOGY | Facility: CLINIC | Age: 30
End: 2020-06-29

## 2020-06-29 NOTE — RESULT ENCOUNTER NOTE
Please call the patient regarding labs - tsh improved - continue levothyroxine at current dose - cholesterol is a little high - watch diet

## 2020-10-28 ENCOUNTER — OFFICE VISIT (OUTPATIENT)
Dept: ENDOCRINOLOGY | Facility: CLINIC | Age: 30
End: 2020-10-28
Payer: COMMERCIAL

## 2020-10-28 VITALS
WEIGHT: 189.5 LBS | TEMPERATURE: 98.1 F | HEART RATE: 60 BPM | SYSTOLIC BLOOD PRESSURE: 120 MMHG | DIASTOLIC BLOOD PRESSURE: 80 MMHG | HEIGHT: 65 IN | BODY MASS INDEX: 31.57 KG/M2

## 2020-10-28 DIAGNOSIS — E04.1 THYROID NODULE: ICD-10-CM

## 2020-10-28 DIAGNOSIS — E03.8 OTHER SPECIFIED HYPOTHYROIDISM: Primary | ICD-10-CM

## 2020-10-28 DIAGNOSIS — E66.09 CLASS 1 OBESITY DUE TO EXCESS CALORIES WITH SERIOUS COMORBIDITY AND BODY MASS INDEX (BMI) OF 32.0 TO 32.9 IN ADULT: ICD-10-CM

## 2020-10-28 DIAGNOSIS — E78.5 HYPERLIPIDEMIA, UNSPECIFIED HYPERLIPIDEMIA TYPE: ICD-10-CM

## 2020-10-28 PROCEDURE — 99214 OFFICE O/P EST MOD 30 MIN: CPT | Performed by: PHYSICIAN ASSISTANT

## 2020-10-28 RX ORDER — CYCLOBENZAPRINE HCL 10 MG
10 TABLET ORAL 3 TIMES DAILY PRN
COMMUNITY

## 2020-10-28 RX ORDER — PHENOL 1.4 %
AEROSOL, SPRAY (ML) MUCOUS MEMBRANE
COMMUNITY

## 2020-12-18 ENCOUNTER — LAB (OUTPATIENT)
Dept: LAB | Facility: MEDICAL CENTER | Age: 30
End: 2020-12-18
Payer: COMMERCIAL

## 2020-12-18 DIAGNOSIS — E03.8 OTHER SPECIFIED HYPOTHYROIDISM: ICD-10-CM

## 2020-12-18 LAB
T4 FREE SERPL-MCNC: 1.09 NG/DL (ref 0.76–1.46)
TSH SERPL DL<=0.05 MIU/L-ACNC: 1.84 UIU/ML (ref 0.36–3.74)

## 2020-12-18 PROCEDURE — 84443 ASSAY THYROID STIM HORMONE: CPT

## 2020-12-18 PROCEDURE — 84439 ASSAY OF FREE THYROXINE: CPT

## 2020-12-18 PROCEDURE — 36415 COLL VENOUS BLD VENIPUNCTURE: CPT

## 2021-04-29 ENCOUNTER — APPOINTMENT (OUTPATIENT)
Dept: LAB | Facility: CLINIC | Age: 31
End: 2021-04-29
Payer: COMMERCIAL

## 2021-04-29 DIAGNOSIS — E03.8 OTHER SPECIFIED HYPOTHYROIDISM: ICD-10-CM

## 2021-04-29 DIAGNOSIS — E78.5 HYPERLIPIDEMIA, UNSPECIFIED HYPERLIPIDEMIA TYPE: ICD-10-CM

## 2021-04-29 LAB
ALBUMIN SERPL BCP-MCNC: 3.6 G/DL (ref 3.5–5)
ALP SERPL-CCNC: 105 U/L (ref 46–116)
ALT SERPL W P-5'-P-CCNC: 105 U/L (ref 12–78)
ANION GAP SERPL CALCULATED.3IONS-SCNC: 4 MMOL/L (ref 4–13)
AST SERPL W P-5'-P-CCNC: 32 U/L (ref 5–45)
BILIRUB SERPL-MCNC: 0.54 MG/DL (ref 0.2–1)
BUN SERPL-MCNC: 11 MG/DL (ref 5–25)
CALCIUM SERPL-MCNC: 9.5 MG/DL (ref 8.3–10.1)
CHLORIDE SERPL-SCNC: 106 MMOL/L (ref 100–108)
CHOLEST SERPL-MCNC: 216 MG/DL (ref 50–200)
CO2 SERPL-SCNC: 24 MMOL/L (ref 21–32)
CREAT SERPL-MCNC: 0.86 MG/DL (ref 0.6–1.3)
GFR SERPL CREATININE-BSD FRML MDRD: 90 ML/MIN/1.73SQ M
GLUCOSE P FAST SERPL-MCNC: 92 MG/DL (ref 65–99)
HDLC SERPL-MCNC: 75 MG/DL
LDLC SERPL CALC-MCNC: 102 MG/DL (ref 0–100)
POTASSIUM SERPL-SCNC: 3.9 MMOL/L (ref 3.5–5.3)
PROT SERPL-MCNC: 7.6 G/DL (ref 6.4–8.2)
SODIUM SERPL-SCNC: 134 MMOL/L (ref 136–145)
T4 FREE SERPL-MCNC: 1.19 NG/DL (ref 0.76–1.46)
TRIGL SERPL-MCNC: 194 MG/DL
TSH SERPL DL<=0.05 MIU/L-ACNC: 2.14 UIU/ML (ref 0.36–3.74)

## 2021-04-29 PROCEDURE — 36415 COLL VENOUS BLD VENIPUNCTURE: CPT

## 2021-04-29 PROCEDURE — 84439 ASSAY OF FREE THYROXINE: CPT

## 2021-04-29 PROCEDURE — 84443 ASSAY THYROID STIM HORMONE: CPT

## 2021-04-29 PROCEDURE — 80061 LIPID PANEL: CPT

## 2021-04-29 PROCEDURE — 80053 COMPREHEN METABOLIC PANEL: CPT

## 2021-05-10 ENCOUNTER — OFFICE VISIT (OUTPATIENT)
Dept: ENDOCRINOLOGY | Facility: CLINIC | Age: 31
End: 2021-05-10
Payer: COMMERCIAL

## 2021-05-10 VITALS
DIASTOLIC BLOOD PRESSURE: 84 MMHG | HEART RATE: 92 BPM | BODY MASS INDEX: 31.69 KG/M2 | WEIGHT: 190.2 LBS | HEIGHT: 65 IN | SYSTOLIC BLOOD PRESSURE: 118 MMHG

## 2021-05-10 DIAGNOSIS — E06.3 HASHIMOTO'S THYROIDITIS: ICD-10-CM

## 2021-05-10 DIAGNOSIS — E03.8 OTHER SPECIFIED HYPOTHYROIDISM: ICD-10-CM

## 2021-05-10 DIAGNOSIS — R53.83 FATIGUE, UNSPECIFIED TYPE: ICD-10-CM

## 2021-05-10 DIAGNOSIS — E78.5 HYPERLIPIDEMIA, UNSPECIFIED HYPERLIPIDEMIA TYPE: ICD-10-CM

## 2021-05-10 DIAGNOSIS — E03.8 HYPOTHYROIDISM DUE TO HASHIMOTO'S THYROIDITIS: Primary | ICD-10-CM

## 2021-05-10 DIAGNOSIS — E06.3 HYPOTHYROIDISM DUE TO HASHIMOTO'S THYROIDITIS: Primary | ICD-10-CM

## 2021-05-10 DIAGNOSIS — R79.89 ELEVATED LFTS: ICD-10-CM

## 2021-05-10 PROCEDURE — 99214 OFFICE O/P EST MOD 30 MIN: CPT | Performed by: INTERNAL MEDICINE

## 2021-05-10 RX ORDER — LEVOTHYROXINE SODIUM 0.05 MG/1
TABLET ORAL
Qty: 120 TABLET | Refills: 4 | Status: SHIPPED | OUTPATIENT
Start: 2021-05-10 | End: 2021-05-10 | Stop reason: SDUPTHER

## 2021-05-10 RX ORDER — MULTIVIT WITH MINERALS/LUTEIN
1000 TABLET ORAL DAILY
COMMUNITY

## 2021-05-10 RX ORDER — LEVOTHYROXINE SODIUM 0.05 MG/1
TABLET ORAL
Qty: 120 TABLET | Refills: 4 | Status: SHIPPED | OUTPATIENT
Start: 2021-05-10 | End: 2021-07-16 | Stop reason: SDUPTHER

## 2021-05-11 ENCOUNTER — LAB (OUTPATIENT)
Dept: LAB | Facility: HOSPITAL | Age: 31
End: 2021-05-11
Attending: INTERNAL MEDICINE
Payer: COMMERCIAL

## 2021-05-11 DIAGNOSIS — R79.89 ELEVATED LFTS: ICD-10-CM

## 2021-05-11 DIAGNOSIS — R53.83 FATIGUE, UNSPECIFIED TYPE: ICD-10-CM

## 2021-05-11 LAB
25(OH)D3 SERPL-MCNC: 34.1 NG/ML (ref 30–100)
ALBUMIN SERPL BCP-MCNC: 3.9 G/DL (ref 3.5–5)
ALP SERPL-CCNC: 93 U/L (ref 46–116)
ALT SERPL W P-5'-P-CCNC: 77 U/L (ref 12–78)
AST SERPL W P-5'-P-CCNC: 37 U/L (ref 5–45)
BASOPHILS # BLD AUTO: 0.04 THOUSANDS/ΜL (ref 0–0.1)
BASOPHILS NFR BLD AUTO: 0 % (ref 0–1)
BILIRUB DIRECT SERPL-MCNC: 0.19 MG/DL (ref 0–0.2)
BILIRUB SERPL-MCNC: 0.68 MG/DL (ref 0.2–1)
EOSINOPHIL # BLD AUTO: 0.09 THOUSAND/ΜL (ref 0–0.61)
EOSINOPHIL NFR BLD AUTO: 1 % (ref 0–6)
ERYTHROCYTE [DISTWIDTH] IN BLOOD BY AUTOMATED COUNT: 12.6 % (ref 11.6–15.1)
HCT VFR BLD AUTO: 43.1 % (ref 34.8–46.1)
HGB BLD-MCNC: 14.2 G/DL (ref 11.5–15.4)
IMM GRANULOCYTES # BLD AUTO: 0.02 THOUSAND/UL (ref 0–0.2)
IMM GRANULOCYTES NFR BLD AUTO: 0 % (ref 0–2)
LYMPHOCYTES # BLD AUTO: 2.37 THOUSANDS/ΜL (ref 0.6–4.47)
LYMPHOCYTES NFR BLD AUTO: 26 % (ref 14–44)
MCH RBC QN AUTO: 30.1 PG (ref 26.8–34.3)
MCHC RBC AUTO-ENTMCNC: 32.9 G/DL (ref 31.4–37.4)
MCV RBC AUTO: 92 FL (ref 82–98)
MONOCYTES # BLD AUTO: 0.56 THOUSAND/ΜL (ref 0.17–1.22)
MONOCYTES NFR BLD AUTO: 6 % (ref 4–12)
NEUTROPHILS # BLD AUTO: 6.22 THOUSANDS/ΜL (ref 1.85–7.62)
NEUTS SEG NFR BLD AUTO: 67 % (ref 43–75)
NRBC BLD AUTO-RTO: 0 /100 WBCS
PLATELET # BLD AUTO: 283 THOUSANDS/UL (ref 149–390)
PMV BLD AUTO: 10.6 FL (ref 8.9–12.7)
PROT SERPL-MCNC: 8.2 G/DL (ref 6.4–8.2)
RBC # BLD AUTO: 4.71 MILLION/UL (ref 3.81–5.12)
WBC # BLD AUTO: 9.3 THOUSAND/UL (ref 4.31–10.16)

## 2021-05-11 PROCEDURE — 82306 VITAMIN D 25 HYDROXY: CPT

## 2021-05-11 PROCEDURE — 36415 COLL VENOUS BLD VENIPUNCTURE: CPT

## 2021-05-11 PROCEDURE — 85025 COMPLETE CBC W/AUTO DIFF WBC: CPT

## 2021-05-11 PROCEDURE — 80076 HEPATIC FUNCTION PANEL: CPT

## 2021-05-12 ENCOUNTER — TELEPHONE (OUTPATIENT)
Dept: ENDOCRINOLOGY | Facility: CLINIC | Age: 31
End: 2021-05-12

## 2021-05-12 NOTE — TELEPHONE ENCOUNTER
----- Message from Christoper Moritz, MD sent at 5/12/2021  8:28 AM EDT -----  Please call the patient regarding labs - labs normal , continue current meds

## 2021-07-16 DIAGNOSIS — E03.8 OTHER SPECIFIED HYPOTHYROIDISM: ICD-10-CM

## 2021-07-16 RX ORDER — LEVOTHYROXINE SODIUM 0.05 MG/1
TABLET ORAL
Qty: 120 TABLET | Refills: 4 | Status: SHIPPED | OUTPATIENT
Start: 2021-07-16 | End: 2022-06-03 | Stop reason: SDUPTHER

## 2021-11-17 ENCOUNTER — APPOINTMENT (OUTPATIENT)
Dept: LAB | Facility: HOSPITAL | Age: 31
End: 2021-11-17
Attending: INTERNAL MEDICINE
Payer: COMMERCIAL

## 2021-11-17 DIAGNOSIS — E78.5 HYPERLIPIDEMIA, UNSPECIFIED HYPERLIPIDEMIA TYPE: ICD-10-CM

## 2021-11-17 DIAGNOSIS — E03.8 HYPOTHYROIDISM DUE TO HASHIMOTO'S THYROIDITIS: ICD-10-CM

## 2021-11-17 DIAGNOSIS — E06.3 HYPOTHYROIDISM DUE TO HASHIMOTO'S THYROIDITIS: ICD-10-CM

## 2021-11-17 LAB
ALBUMIN SERPL BCP-MCNC: 4 G/DL (ref 3.5–5)
ALP SERPL-CCNC: 89 U/L (ref 46–116)
ALT SERPL W P-5'-P-CCNC: 48 U/L (ref 12–78)
ANION GAP SERPL CALCULATED.3IONS-SCNC: 9 MMOL/L (ref 4–13)
AST SERPL W P-5'-P-CCNC: 20 U/L (ref 5–45)
BILIRUB SERPL-MCNC: 0.76 MG/DL (ref 0.2–1)
BUN SERPL-MCNC: 9 MG/DL (ref 5–25)
CALCIUM SERPL-MCNC: 9.3 MG/DL (ref 8.3–10.1)
CHLORIDE SERPL-SCNC: 98 MMOL/L (ref 100–108)
CHOLEST SERPL-MCNC: 223 MG/DL (ref 50–200)
CO2 SERPL-SCNC: 27 MMOL/L (ref 21–32)
CREAT SERPL-MCNC: 0.99 MG/DL (ref 0.6–1.3)
GFR SERPL CREATININE-BSD FRML MDRD: 76 ML/MIN/1.73SQ M
GLUCOSE P FAST SERPL-MCNC: 100 MG/DL (ref 65–99)
HDLC SERPL-MCNC: 77 MG/DL
LDLC SERPL CALC-MCNC: 118 MG/DL (ref 0–100)
POTASSIUM SERPL-SCNC: 4 MMOL/L (ref 3.5–5.3)
PROT SERPL-MCNC: 7.9 G/DL (ref 6.4–8.2)
SODIUM SERPL-SCNC: 134 MMOL/L (ref 136–145)
T4 FREE SERPL-MCNC: 1.2 NG/DL (ref 0.76–1.46)
TRIGL SERPL-MCNC: 139 MG/DL
TSH SERPL DL<=0.05 MIU/L-ACNC: 2.07 UIU/ML (ref 0.36–3.74)

## 2021-11-17 PROCEDURE — 36415 COLL VENOUS BLD VENIPUNCTURE: CPT

## 2021-11-17 PROCEDURE — 84439 ASSAY OF FREE THYROXINE: CPT

## 2021-11-17 PROCEDURE — 80061 LIPID PANEL: CPT

## 2021-11-17 PROCEDURE — 84443 ASSAY THYROID STIM HORMONE: CPT

## 2021-11-17 PROCEDURE — 80053 COMPREHEN METABOLIC PANEL: CPT

## 2021-11-22 ENCOUNTER — OFFICE VISIT (OUTPATIENT)
Dept: ENDOCRINOLOGY | Facility: CLINIC | Age: 31
End: 2021-11-22
Payer: COMMERCIAL

## 2021-11-22 VITALS
DIASTOLIC BLOOD PRESSURE: 80 MMHG | HEART RATE: 80 BPM | SYSTOLIC BLOOD PRESSURE: 132 MMHG | WEIGHT: 176 LBS | HEIGHT: 65 IN | BODY MASS INDEX: 29.32 KG/M2

## 2021-11-22 DIAGNOSIS — E66.3 OVERWEIGHT: ICD-10-CM

## 2021-11-22 DIAGNOSIS — E78.5 HYPERLIPIDEMIA, UNSPECIFIED HYPERLIPIDEMIA TYPE: ICD-10-CM

## 2021-11-22 DIAGNOSIS — E06.3 HASHIMOTO'S THYROIDITIS: Primary | ICD-10-CM

## 2021-11-22 DIAGNOSIS — E06.3 HYPOTHYROIDISM DUE TO HASHIMOTO'S THYROIDITIS: ICD-10-CM

## 2021-11-22 DIAGNOSIS — E04.1 THYROID NODULE: ICD-10-CM

## 2021-11-22 DIAGNOSIS — E03.8 HYPOTHYROIDISM DUE TO HASHIMOTO'S THYROIDITIS: ICD-10-CM

## 2021-11-22 DIAGNOSIS — R53.83 FATIGUE, UNSPECIFIED TYPE: ICD-10-CM

## 2021-11-22 PROCEDURE — 99214 OFFICE O/P EST MOD 30 MIN: CPT | Performed by: PHYSICIAN ASSISTANT

## 2021-11-22 RX ORDER — CHOLECALCIFEROL (VITAMIN D3) 125 MCG
1000 CAPSULE ORAL DAILY
COMMUNITY

## 2022-04-13 ENCOUNTER — OFFICE VISIT (OUTPATIENT)
Dept: URGENT CARE | Facility: MEDICAL CENTER | Age: 32
End: 2022-04-13
Payer: COMMERCIAL

## 2022-04-13 VITALS
OXYGEN SATURATION: 100 % | TEMPERATURE: 98.3 F | RESPIRATION RATE: 18 BRPM | BODY MASS INDEX: 29.16 KG/M2 | SYSTOLIC BLOOD PRESSURE: 110 MMHG | HEIGHT: 65 IN | HEART RATE: 90 BPM | DIASTOLIC BLOOD PRESSURE: 70 MMHG | WEIGHT: 175 LBS

## 2022-04-13 DIAGNOSIS — R05.9 COUGH: Primary | ICD-10-CM

## 2022-04-13 PROCEDURE — 99212 OFFICE O/P EST SF 10 MIN: CPT | Performed by: PHYSICIAN ASSISTANT

## 2022-04-13 RX ORDER — ALBUTEROL SULFATE 90 UG/1
2 AEROSOL, METERED RESPIRATORY (INHALATION) EVERY 4 HOURS PRN
Qty: 18 G | Refills: 0 | Status: SHIPPED | OUTPATIENT
Start: 2022-04-13 | End: 2022-05-13

## 2022-04-13 NOTE — PROGRESS NOTES
[facility]        NAME: Chanel Champion is a 33 y o  female  : 1990    MRN: [id number]  DATE: 2022  TIME: 4:17 PM    Assessment and Plan   Cough [R05 9]  1  Cough  albuterol (PROVENTIL HFA,VENTOLIN HFA) 90 mcg/act inhaler         Patient Instructions       Follow up with PCP in 3-5 days  Proceed to  ER if symptoms worsen  Chief Complaint     Chief Complaint   Patient presents with    Cough     x 3 weeks          History of Present Illness       Patient started with cold-like symptoms 3 weeks ago  She states she is no longer feel sick but has a lingering cough  The cough is primarily dry  No fever or chills  Review of Systems   Review of Systems   Constitutional: Negative for chills and fever  HENT: Negative for congestion, rhinorrhea and sore throat  Respiratory: Positive for cough  Gastrointestinal: Negative for diarrhea, nausea and vomiting  Musculoskeletal: Negative for myalgias  Skin: Negative for rash  Neurological: Negative for dizziness and headaches           Current Medications       Current Outpatient Medications:     Ascorbic Acid (vitamin C) 1000 MG tablet, Take 1,000 mg by mouth daily, Disp: , Rfl:     Cholecalciferol (Vitamin D3) 50 MCG (2000 UT) TABS, Take 1,000 Units by mouth daily, Disp: , Rfl:     cyclobenzaprine (FLEXERIL) 10 mg tablet, Take 10 mg by mouth 3 (three) times a day as needed for muscle spasms, Disp: , Rfl:     drospirenone-ethinyl estradiol (OCELLA) 3-0 03 MG per tablet, Take 1 tablet by mouth daily, Disp: , Rfl:     levothyroxine 50 mcg tablet, 1 tab mon-fri and 2 on sat and , Disp: 120 tablet, Rfl: 4    Melatonin 10 MG TABS, Take by mouth, Disp: , Rfl:     Multiple Vitamins-Minerals (MULTIVITAMIN GUMMIES ADULT) CHEW, Chew, Disp: , Rfl:     Omega-3 Fatty Acids (OMEGA-3 FISH OIL PO), Take by mouth, Disp: , Rfl:     albuterol (PROVENTIL HFA,VENTOLIN HFA) 90 mcg/act inhaler, Inhale 2 puffs every 4 (four) hours as needed for wheezing or shortness of breath, Disp: 18 g, Rfl: 0    Current Allergies     Allergies as of 04/13/2022 - Reviewed 04/13/2022   Allergen Reaction Noted    Tussin [guaifenesin] Hives 10/24/2016            The following portions of the patient's history were reviewed and updated as appropriate: allergies, current medications, past family history, past medical history, past social history, past surgical history and problem list      Past Medical History:   Diagnosis Date    Hashimoto's disease     Stress headaches        Past Surgical History:   Procedure Laterality Date    NOSE SURGERY      ROTATOR CUFF REPAIR Right     TONSILLECTOMY         Family History   Problem Relation Age of Onset    Thyroid disease unspecified Sister     Thyroid disease unspecified Maternal Aunt     Breast cancer Maternal Grandmother     Thyroid disease unspecified Maternal Grandmother     Diabetes unspecified Maternal Grandfather          Medications have been verified  Objective   /70   Pulse 90   Temp 98 3 °F (36 8 °C)   Resp 18   Ht 5' 5" (1 651 m)   Wt 79 4 kg (175 lb)   LMP 03/31/2022   SpO2 100%   BMI 29 12 kg/m²   Patient's last menstrual period was 03/31/2022  Physical Exam     Physical Exam  Vitals and nursing note reviewed  Constitutional:       Appearance: Normal appearance  HENT:      Head: Normocephalic and atraumatic  Right Ear: Tympanic membrane normal       Left Ear: Tympanic membrane normal       Mouth/Throat:      Mouth: Mucous membranes are moist       Pharynx: Oropharynx is clear  Eyes:      Conjunctiva/sclera: Conjunctivae normal    Cardiovascular:      Rate and Rhythm: Normal rate and regular rhythm  Heart sounds: Normal heart sounds  Pulmonary:      Effort: Pulmonary effort is normal       Breath sounds: Normal breath sounds  Skin:     General: Skin is warm  Neurological:      Mental Status: She is alert

## 2022-04-13 NOTE — PATIENT INSTRUCTIONS
Acute Cough   AMBULATORY CARE:   An acute cough  can last up to 3 weeks  Common causes of an acute cough include a cold, allergies, or a lung infection  Seek care immediately if:   · You have trouble breathing or feel short of breath  · You cough up blood, or you see blood in your mucus  · You faint or feel weak or dizzy  · You have chest pain when you cough or take a deep breath  · You have new wheezing  Contact your healthcare provider if:   · You have a fever  · Your cough lasts longer than 4 weeks  · Your symptoms do not improve with treatment  · You have questions or concerns about your condition or care  Treatment:  An acute cough usually goes away on its own  Ask your healthcare provider about medicines you can take to decrease your cough  You may need medicine to stop the cough, decrease swelling in your airways, or help open your airways  Medicine may also be given to help you cough up mucus  If you have an infection caused by bacteria, you may need antibiotics  Manage your symptoms:   · Do not smoke and stay away from others who smoke  Nicotine and other chemicals in cigarettes and cigars can cause lung damage and make your cough worse  Ask your healthcare provider for information if you currently smoke and need help to quit  E-cigarettes or smokeless tobacco still contain nicotine  Talk to your healthcare provider before you use these products  · Drink extra liquids as directed  Liquids will help thin and loosen mucus so you can cough it up  Liquids will also help prevent dehydration  Examples of good liquids to drink include water, fruit juice, and broth  Do not drink liquids that contain caffeine  Caffeine can increase your risk for dehydration  Ask your healthcare provider how much liquid to drink each day  · Rest as directed  Do not do activities that make your cough worse, such as exercise  · Use a humidifier or vaporizer    Use a cool mist humidifier or a vaporizer to increase air moisture in your home  This may make it easier for you to breathe and help decrease your cough  · Eat 2 to 5 mL of honey 2 times each day  Honey can help thin mucus and decrease your cough  · Use cough drops or lozenges  These can help decrease throat irritation and your cough  Follow up with your healthcare provider as directed:  Write down your questions so you remember to ask them during your visits  © Copyright [employer] 2022 Information is for End User's use only and may not be sold, redistributed or otherwise used for commercial purposes  All illustrations and images included in CareNotes® are the copyrighted property of A D A M , Inc  or [facility] Essence Diaz   The above information is an  only  It is not intended as medical advice for individual conditions or treatments  Talk to your doctor, nurse or pharmacist before following any medical regimen to see if it is safe and effective for you

## 2022-05-24 ENCOUNTER — OFFICE VISIT (OUTPATIENT)
Dept: URGENT CARE | Facility: MEDICAL CENTER | Age: 32
End: 2022-05-24
Payer: COMMERCIAL

## 2022-05-24 VITALS
RESPIRATION RATE: 18 BRPM | BODY MASS INDEX: 29.49 KG/M2 | TEMPERATURE: 98.4 F | HEART RATE: 91 BPM | HEIGHT: 65 IN | OXYGEN SATURATION: 99 % | WEIGHT: 177 LBS

## 2022-05-24 DIAGNOSIS — R20.0 NUMBNESS OF TOES: Primary | ICD-10-CM

## 2022-05-24 PROCEDURE — 99212 OFFICE O/P EST SF 10 MIN: CPT | Performed by: PHYSICIAN ASSISTANT

## 2022-05-24 NOTE — PROGRESS NOTES
[facility]        NAME: Gary Almaguer is a 33 y o  female  : 1990    MRN: [id number]  DATE: May 24, 2022  TIME: 8:40 AM    Assessment and Plan   Numbness of toes [R20 0]  1  Numbness of toes  Ambulatory Referral to Podiatry         Patient Instructions     Wear good supportive shoes even when at home  Taken anti-inflammatory of your choice  Follow-up with podiatry  Follow up with PCP in 3-5 days  Proceed to  ER if symptoms worsen  Chief Complaint     Chief Complaint   Patient presents with    Toe Swelling     Right 5th toe, red, swollen, denies pain but states " it doesn't feel right", denies injury         History of Present Illness       Patient presents with a several day history of numbness of her right 5th toe  She has slight redness no pain  No known injuries  She has a history of her toes turning white in the cold and then abby when dependent  States her symptoms resolved when she warms her feet  Walks around the house primarily barefoot  Does have a history of Hashimoto's thyroiditis follows with an endocrinologist   No history of other autoimmune disorders  He denies any joint pain fevers chills rashes  Does have a history of Lyme disease at the age of 1  No known tick bite  The patient states she may have something going on her back she follows with a chiropractor does not know a diagnosis  No bowel or bladder incontinence or lower extremity muscle weakness  Review of Systems   Review of Systems   Constitutional: Negative for chills and fever  Cardiovascular: Negative for chest pain and palpitations  Musculoskeletal: Negative for arthralgias  Skin: Negative for rash  Neurological: Negative for dizziness and headaches           Current Medications       Current Outpatient Medications:     Ascorbic Acid (vitamin C) 1000 MG tablet, Take 1,000 mg by mouth daily, Disp: , Rfl:     Cholecalciferol (Vitamin D3) 50 MCG (2000) TABS, Take 1,000 Units by mouth daily, Disp: , Rfl:     cyclobenzaprine (FLEXERIL) 10 mg tablet, Take 10 mg by mouth 3 (three) times a day as needed for muscle spasms, Disp: , Rfl:     drospirenone-ethinyl estradiol (KIMBERLEY) 3-0 03 MG per tablet, Take 1 tablet by mouth daily, Disp: , Rfl:     levothyroxine 50 mcg tablet, 1 tab mon-fri and 2 on sat and Sunday, Disp: 120 tablet, Rfl: 4    Melatonin 10 MG TABS, Take by mouth, Disp: , Rfl:     Multiple Vitamins-Minerals (MULTIVITAMIN GUMMIES ADULT) CHEW, Chew, Disp: , Rfl:     Omega-3 Fatty Acids (OMEGA-3 FISH OIL PO), Take by mouth (Patient not taking: Reported on 5/24/2022), Disp: , Rfl:     Current Allergies     Allergies as of 05/24/2022 - Reviewed 05/24/2022   Allergen Reaction Noted    Tussin [guaifenesin] Hives 10/24/2016            The following portions of the patient's history were reviewed and updated as appropriate: allergies, current medications, past family history, past medical history, past social history, past surgical history and problem list      Past Medical History:   Diagnosis Date    Hashimoto's disease     Stress headaches        Past Surgical History:   Procedure Laterality Date    NOSE SURGERY      ROTATOR CUFF REPAIR Right     TONSILLECTOMY         Family History   Problem Relation Age of Onset    Thyroid disease unspecified Sister     Thyroid disease unspecified Maternal Aunt     Breast cancer Maternal Grandmother     Thyroid disease unspecified Maternal Grandmother     Diabetes unspecified Maternal Grandfather          Medications have been verified  Objective   Pulse 91   Temp 98 4 °F (36 9 °C)   Resp 18   Ht 5' 5" (1 651 m)   Wt 80 3 kg (177 lb)   LMP 05/19/2022   SpO2 99%   BMI 29 45 kg/m²   Patient's last menstrual period was 05/19/2022  Physical Exam     Physical Exam  Vitals and nursing note reviewed  Constitutional:       Appearance: Normal appearance  HENT:      Head: Normocephalic and atraumatic     Cardiovascular:      Rate and Rhythm: Normal rate and regular rhythm  Pulmonary:      Effort: Pulmonary effort is normal    Musculoskeletal:      Comments: Right 5th toe with mild erythema no tenderness to palpation  She does have diminished sensation of the right 5th toe as compared to the left 5th toe  Capillary refill less than 2 seconds  Dorsalis pedal pulse intact  Skin:     General: Skin is warm  Neurological:      Mental Status: She is alert

## 2022-06-01 ENCOUNTER — APPOINTMENT (OUTPATIENT)
Dept: LAB | Facility: HOSPITAL | Age: 32
End: 2022-06-01
Payer: COMMERCIAL

## 2022-06-01 DIAGNOSIS — E06.3 HYPOTHYROIDISM DUE TO HASHIMOTO'S THYROIDITIS: ICD-10-CM

## 2022-06-01 DIAGNOSIS — E06.3 HASHIMOTO'S THYROIDITIS: ICD-10-CM

## 2022-06-01 DIAGNOSIS — E03.8 HYPOTHYROIDISM DUE TO HASHIMOTO'S THYROIDITIS: ICD-10-CM

## 2022-06-01 LAB
ALBUMIN SERPL BCP-MCNC: 3.7 G/DL (ref 3.5–5)
ALP SERPL-CCNC: 72 U/L (ref 46–116)
ALT SERPL W P-5'-P-CCNC: 29 U/L (ref 12–78)
ANION GAP SERPL CALCULATED.3IONS-SCNC: 11 MMOL/L (ref 4–13)
AST SERPL W P-5'-P-CCNC: 12 U/L (ref 5–45)
BILIRUB SERPL-MCNC: 0.63 MG/DL (ref 0.2–1)
BUN SERPL-MCNC: 10 MG/DL (ref 5–25)
CALCIUM SERPL-MCNC: 9.4 MG/DL (ref 8.3–10.1)
CHLORIDE SERPL-SCNC: 100 MMOL/L (ref 100–108)
CHOLEST SERPL-MCNC: 195 MG/DL
CO2 SERPL-SCNC: 25 MMOL/L (ref 21–32)
CREAT SERPL-MCNC: 0.88 MG/DL (ref 0.6–1.3)
GFR SERPL CREATININE-BSD FRML MDRD: 86 ML/MIN/1.73SQ M
GLUCOSE P FAST SERPL-MCNC: 102 MG/DL (ref 65–99)
HDLC SERPL-MCNC: 66 MG/DL
LDLC SERPL CALC-MCNC: 98 MG/DL (ref 0–100)
POTASSIUM SERPL-SCNC: 4 MMOL/L (ref 3.5–5.3)
PROT SERPL-MCNC: 7.4 G/DL (ref 6.4–8.2)
SODIUM SERPL-SCNC: 136 MMOL/L (ref 136–145)
TRIGL SERPL-MCNC: 155 MG/DL
TSH SERPL DL<=0.05 MIU/L-ACNC: 1.67 UIU/ML (ref 0.45–4.5)

## 2022-06-01 PROCEDURE — 80061 LIPID PANEL: CPT

## 2022-06-01 PROCEDURE — 80053 COMPREHEN METABOLIC PANEL: CPT

## 2022-06-01 PROCEDURE — 84443 ASSAY THYROID STIM HORMONE: CPT

## 2022-06-01 PROCEDURE — 36415 COLL VENOUS BLD VENIPUNCTURE: CPT

## 2022-06-03 ENCOUNTER — OFFICE VISIT (OUTPATIENT)
Dept: ENDOCRINOLOGY | Facility: CLINIC | Age: 32
End: 2022-06-03
Payer: COMMERCIAL

## 2022-06-03 VITALS
DIASTOLIC BLOOD PRESSURE: 80 MMHG | HEIGHT: 65 IN | BODY MASS INDEX: 28.84 KG/M2 | WEIGHT: 173.13 LBS | SYSTOLIC BLOOD PRESSURE: 122 MMHG | HEART RATE: 84 BPM

## 2022-06-03 DIAGNOSIS — E78.5 HYPERLIPIDEMIA, UNSPECIFIED HYPERLIPIDEMIA TYPE: ICD-10-CM

## 2022-06-03 DIAGNOSIS — E06.3 HYPOTHYROIDISM DUE TO HASHIMOTO'S THYROIDITIS: Primary | ICD-10-CM

## 2022-06-03 DIAGNOSIS — E03.8 OTHER SPECIFIED HYPOTHYROIDISM: ICD-10-CM

## 2022-06-03 DIAGNOSIS — R73.01 IMPAIRED FASTING GLUCOSE: ICD-10-CM

## 2022-06-03 DIAGNOSIS — E06.3 HASHIMOTO'S THYROIDITIS: ICD-10-CM

## 2022-06-03 DIAGNOSIS — E03.8 HYPOTHYROIDISM DUE TO HASHIMOTO'S THYROIDITIS: Primary | ICD-10-CM

## 2022-06-03 PROCEDURE — 99214 OFFICE O/P EST MOD 30 MIN: CPT | Performed by: INTERNAL MEDICINE

## 2022-06-03 RX ORDER — LEVOTHYROXINE SODIUM 0.05 MG/1
TABLET ORAL
Qty: 120 TABLET | Refills: 4 | Status: SHIPPED | OUTPATIENT
Start: 2022-06-03

## 2022-06-03 NOTE — PROGRESS NOTES
Urmila Bautista 33 y o  female MRN: [id number]    Encounter: [id number]      Assessment/Plan     Problem List Items Addressed This Visit        Endocrine    Hashimoto's thyroiditis    Relevant Medications    levothyroxine 50 mcg tablet    Hypothyroidism - Primary     TSH is optimal-continue levothyroxine at current dose           Relevant Medications    levothyroxine 50 mcg tablet    Other Relevant Orders    TSH, 3rd generation Lab Collect    T4, free Lab Collect    Impaired fasting glucose     Focus on dietary and lifestyle modifications  Repeat fasting glucose, check hemoglobin A1c as well as chas and islet cell antibodies next visit  Stop biotin for 1 week prior to lab testing           Relevant Orders    Comprehensive metabolic panel Lab Collect    HEMOGLOBIN A1C W/ EAG ESTIMATION Lab Collect    IA2 Autoantibodies Lab Collect    Glutamic acid decarboxylase Lab Collect       Other    Hyperlipidemia, unspecified     LDL improved off fish oil-triglycerides is mildly elevated  Focus on dietary modifications  CC:   Hypothyroidism     History of Present Illness     HPI:  68-year-old female with hypothyroidism due Hashimoto thyroiditis seen in follow-up  She is currently on levothyroxine 50 mcg daily 1 tablet Monday to Friday 2 tabs sat and Sunday and has been taking it regularly and properly  Stopped taking fish oil since her last visit  Admits to eating out/processed food frequently    Generally has been feeling well, denies any polyuria, polydipsia, weight loss  Some anxiety , sleeping well , no fatigue , weight stable       Review of Systems    Historical Information   Past Medical History:   Diagnosis Date    Hashimoto's disease     Stress headaches      Past Surgical History:   Procedure Laterality Date    NOSE SURGERY      ROTATOR CUFF REPAIR Right     TONSILLECTOMY       Social History   Social History     Substance and Sexual Activity   Alcohol Use Yes    Comment: social intake     Social History     Substance and Sexual Activity   Drug Use No     Social History     Tobacco Use   Smoking Status Never Smoker   Smokeless Tobacco Never Used     Family History:   Family History   Problem Relation Age of Onset    Insulin resistance Mother     Thyroid disease unspecified Sister     Thyroid disease unspecified Maternal Aunt     Breast cancer Maternal Grandmother     Thyroid disease unspecified Maternal Grandmother     Diabetes unspecified Maternal Grandfather        Meds/Allergies   Current Outpatient Medications   Medication Sig Dispense Refill    Ascorbic Acid (vitamin C) 1000 MG tablet Take 1,000 mg by mouth daily      Cholecalciferol (Vitamin D3) 50 MCG (2000 UT) TABS Take 1,000 Units by mouth daily      cyclobenzaprine (FLEXERIL) 10 mg tablet Take 10 mg by mouth 3 (three) times a day as needed for muscle spasms      drospirenone-ethinyl estradiol (KIMBERLEY) 3-0 03 MG per tablet Take 1 tablet by mouth daily      levothyroxine 50 mcg tablet 1 tab mon-fri and 2 on sat and Sunday 120 tablet 4    Melatonin 10 MG TABS Take by mouth      Multiple Vitamins-Minerals (MULTIVITAMIN GUMMIES ADULT) CHEW Chew      NON FORMULARY Nifedipine 10% cream       No current facility-administered medications for this visit  Allergies   Allergen Reactions    Tussin [Guaifenesin] Hives     Tussi-12 reaction in childhood (hives)       Objective   Vitals: Blood pressure 122/80, pulse 84, height 5' 5" (1 651 m), weight 78 5 kg (173 lb 2 oz), last menstrual period 05/19/2022  Physical Exam  Vitals reviewed  Constitutional:       Appearance: Normal appearance  She is not ill-appearing or diaphoretic  HENT:      Head: Normocephalic and atraumatic  Eyes:      General: No scleral icterus  Extraocular Movements: Extraocular movements intact  Cardiovascular:      Rate and Rhythm: Normal rate and regular rhythm  Heart sounds: Normal heart sounds  No murmur heard    Pulmonary:      Effort: Pulmonary effort is normal  No respiratory distress  Breath sounds: Normal breath sounds  No wheezing  Abdominal:      General: There is no distension  Palpations: Abdomen is soft  Tenderness: There is no abdominal tenderness  There is no guarding  Musculoskeletal:      Cervical back: Neck supple  Right lower leg: No edema  Left lower leg: No edema  Lymphadenopathy:      Cervical: No cervical adenopathy  Skin:     General: Skin is warm and dry  Neurological:      General: No focal deficit present  Mental Status: She is alert and oriented to person, place, and time  Psychiatric:         Mood and Affect: Mood normal          Behavior: Behavior normal          Thought Content: Thought content normal          Judgment: Judgment normal          The history was obtained from the review of the chart, patient  Lab Results:   Lab Results   Component Value Date/Time    TSH 3RD GENERATON 1 667 06/01/2022 07:19 AM    TSH 3RD GENERATON 2 074 11/17/2021 08:16 AM    Free T4 1 20 11/17/2021 08:16 AM       Imaging Studies:   Results for orders placed during the hospital encounter of 10/11/19    US thyroid    Impression  1  Heterogeneous appearance of the thyroid gland in keeping with a #the side that is  2   Previously described nodule within the isthmus is favored to represent a juxta thyroidal lymph node  Reference: ACR Thyroid Imaging, Reporting and Data System (TI-RADS): White Paper of the [employer]  J AM Karon Radiol 9795;00:140-866  (additional recommendations based on American Thyroid Association 2015 guidelines )      Workstation performed: [id number]      I have personally reviewed pertinent reports  Portions of the record may have been created with voice recognition software  Occasional wrong word or "sound a like" substitutions may have occurred due to the inherent limitations of voice recognition software   Read the chart carefully and recognize, using context, where substitutions have occurred

## 2022-06-03 NOTE — ASSESSMENT & PLAN NOTE
Focus on dietary and lifestyle modifications    Repeat fasting glucose, check hemoglobin A1c as well as chas and islet cell antibodies next visit  Stop biotin for 1 week prior to lab testing

## 2022-09-16 RX ORDER — VIT C/HESPERIDIN/BIOFLAVONOIDS 500-100 MG
1 TABLET ORAL DAILY
COMMUNITY

## 2022-09-16 RX ORDER — OCTISALATE, AVOBENZONE, HOMOSALATE, AND OCTOCRYLENE 29.4; 29.4; 49; 25.48 MG/ML; MG/ML; MG/ML; MG/ML
1 LOTION TOPICAL DAILY
COMMUNITY

## 2022-09-16 RX ORDER — ANTIARTHRITIC COMBINATION NO.2 900 MG
1 TABLET ORAL DAILY
COMMUNITY

## 2022-09-16 NOTE — PRE-PROCEDURE INSTRUCTIONS
Pre-Surgery Instructions:   Medication Instructions    Ascorbic Acid (vitamin C) 1000 MG tablet Stop taking 7 days prior to surgery   Biotin 5000 MCG TABS Stop taking 7 days prior to surgery   Cholecalciferol (Vitamin D3) 50 MCG (2000 UT) TABS Stop taking 7 days prior to surgery   drospirenone-ethinyl estradiol (KIMBERLEY) 3-0 03 MG per tablet Take day of surgery   levothyroxine 50 mcg tablet Take day of surgery   Melatonin 10 MG TABS Stop taking 7 days prior to surgery   NON FORMULARY Stop taking 7 days prior to surgery   Probiotic Product (Align) 4 MG CAPS Stop taking 7 days prior to surgery   Zinc 30 MG TABS Stop taking 7 days prior to surgery  See above    Pt was instructed to take am meds with a small sip of water

## 2022-09-22 RX ORDER — ACETAMINOPHEN 325 MG/1
975 TABLET ORAL EVERY 6 HOURS PRN
Status: CANCELLED | OUTPATIENT
Start: 2022-09-22

## 2022-09-22 RX ORDER — IBUPROFEN 600 MG/1
600 TABLET ORAL EVERY 6 HOURS PRN
Status: CANCELLED | OUTPATIENT
Start: 2022-09-22

## 2022-09-22 NOTE — H&P
HISTORY AND PHYSICAL         Subjective      Andrei Singh is a 33year old female  Chief Complaint   Patient presents with    Colposcopy            HPI:     This is a 27-year-old  presents the office today for discussion  Patient was originally scheduled for colposcopy    But had 2 colposcopies in the past   Wanted to discuss other options for her persistent cervical dysplasia     PMH:          Past Medical History:   Diagnosis Date    Childhood asthma      Hypothyroidism      Infectious mononucleosis 2007     Mononucleosis infectious    Migraine without aura              Past Surgical History:   Procedure Laterality Date    COLPOSCOPY OF CERVIX W/BIOPSY        Dr Librado Villarreal   2013     Colposcopy    COLPOSCPY CERVIX W/BX AND EC   2007     Colposcopy with biopsies    DENTAL SURGERY PROCEDURE NEC         Dental Surgery Procedure    REMOVAL OF TONSILS, AGE 12+         T & A, age<12    SHOULDER ARTHROSCOPY/REMOVE OBJECT   2006     Shoulder Surgery, Arthroscopic; right    SINUS SURGERY PROCEDURE NEC   2013     Sinus Surgery Proc Unlisted            Family History   Problem Relation Age of Onset    Musculo-skeletal Disorder Mother           fibromyalgia     Mental Disorder Mother           depression     Other (colitis) Mother      Diabetes Grandfather (Maternal)      Heart Disorder Grandfather (Maternal)      Genitourinary Disorder Sister           PCOS    Cancer Grandfather (Paternal)           prostate     Mental Disorder Sister           bi polar     Cancer Grandmother (Maternal)           breast     Thyroid Disorder Grandmother (Maternal)      Thyroid Disorder Aunt (Unspecified)      Thyroid Disorder Aunt (Unspecified)      Thyroid Disorder Aunt (Unspecified)           hyperthyroidism     Thyroid Disorder Sister           graves disease     Neurological Disorder None      Stroke None                  Current Outpatient Medications   Medication Sig Dispense Refill    Multiple Vitamins-Minerals (MULTIVITAMIN GUMMIES ADULT) chewable tablet Take 1 Tab by mouth daily         Levothyroxine Sodium 50 MCG/ML SOLN Take  by mouth  Take  1 tab 5 times weekly        Levothyroxine Sodium 100 MCG/ML SOLN Take 1 Tab by mouth  1 tab twice weekly        Drospirenone-Ethinyl Estradiol 3-0 03 MG Oral Tablet (Ocella) Take by mouth 1 Tablet in the morning  84 Tablet 4      No current facility-administered medications for this visit               Review of patient's allergies indicates: Allergen Reactions    Tussi-12 [Chlorphen Richardson-Carbetapent Richardson]           ROS:  Review of Systems   Constitutional: Negative for activity change, appetite change, fatigue and fever  HENT: Negative for congestion, facial swelling, hearing loss, sinus pressure and sinus pain  Eyes: Negative for discharge and itching  Respiratory: Negative for apnea and chest tightness  Cardiovascular: Negative for chest pain and leg swelling  Gastrointestinal: Negative for abdominal distention, abdominal pain and nausea  Endocrine: Negative for cold intolerance and heat intolerance  Genitourinary: Negative for difficulty urinating, dyspareunia, vaginal bleeding, vaginal discharge and vaginal pain  Musculoskeletal: Negative for arthralgias, back pain and gait problem  Skin: Negative for color change and pallor  Allergic/Immunologic: Negative for environmental allergies  Neurological: Negative for dizziness, facial asymmetry and numbness  Psychiatric/Behavioral: Negative for agitation, behavioral problems and suicidal ideas                   Objective      Pulse 90   Temp 36 2 °C (97 1 °F) (Infrared )   Resp 20   Ht 1 651 m (5' 5")   Wt 78 5 kg (173 lb)   LMP 08/09/2022   SpO2 100%   BMI 28 79 kg/m²   BSA 1 9 m²      Physical Exam:  Physical Exam  Constitutional:       General: She is not in acute distress       Appearance: She is not ill-appearing or toxic-appearing  HENT:      Head: Normocephalic and atraumatic  Nose: Nose normal       Mouth/Throat:      Mouth: Mucous membranes are moist      Eyes:      Conjunctiva/sclera: Conjunctivae normal       Pupils: Pupils are equal, round, and reactive to light  Musculoskeletal:         General: No swelling or tenderness       Neurological:      Mental Status: She is alert and oriented to person, place, and time  Psychiatric:         Mood and Affect: Mood normal          Behavior: Behavior normal                ASSESSMENT/PLAN:     Kristi Guidry was seen today for colposcopy      Diagnoses and all orders for this visit:     Moderate cervical dysplasia     Pre-op testing  -     CBC WITH WBC DIFFERENTIAL     Other orders  -     PREGNANCY URINE SCREEN, POINT OF CARE (ENTER/EDIT)        1  Moderate/persistent cervical dysplasia  -risks benefits and alternatives were discussed with the patient  Is a candidate for LEEP procedure to rule out any severe dysplasia and for treatment of her current dysplasia  -consents were signed  All questions were answered    Patient will be for LEEP procedure September 23rd at [facility]  -of note, patient did receive HPV vaccine

## 2022-09-23 ENCOUNTER — ANESTHESIA EVENT (OUTPATIENT)
Dept: PERIOP | Facility: HOSPITAL | Age: 32
End: 2022-09-23
Payer: COMMERCIAL

## 2022-09-23 ENCOUNTER — ANESTHESIA (OUTPATIENT)
Dept: PERIOP | Facility: HOSPITAL | Age: 32
End: 2022-09-23
Payer: COMMERCIAL

## 2022-09-23 ENCOUNTER — HOSPITAL ENCOUNTER (OUTPATIENT)
Facility: HOSPITAL | Age: 32
Setting detail: OUTPATIENT SURGERY
Discharge: HOME | End: 2022-09-23
Attending: OBSTETRICS & GYNECOLOGY | Admitting: OBSTETRICS & GYNECOLOGY
Payer: COMMERCIAL

## 2022-09-23 VITALS
RESPIRATION RATE: 17 BRPM | TEMPERATURE: 97.4 F | SYSTOLIC BLOOD PRESSURE: 126 MMHG | WEIGHT: 173 LBS | OXYGEN SATURATION: 99 % | HEIGHT: 65 IN | BODY MASS INDEX: 28.82 KG/M2 | HEART RATE: 81 BPM | DIASTOLIC BLOOD PRESSURE: 81 MMHG

## 2022-09-23 DIAGNOSIS — N87.1 MODERATE CERVICAL DYSPLASIA: ICD-10-CM

## 2022-09-23 LAB
EXT PREGNANCY TEST URINE: NEGATIVE
EXT. CONTROL: NORMAL

## 2022-09-23 PROCEDURE — 88307 TISSUE EXAM BY PATHOLOGIST: CPT | Performed by: PATHOLOGY

## 2022-09-23 PROCEDURE — 88344 IMHCHEM/IMCYTCHM EA MLT ANTB: CPT | Performed by: PATHOLOGY

## 2022-09-23 PROCEDURE — 81025 URINE PREGNANCY TEST: CPT | Performed by: OBSTETRICS & GYNECOLOGY

## 2022-09-23 RX ORDER — SODIUM CHLORIDE 9 MG/ML
125 INJECTION, SOLUTION INTRAVENOUS CONTINUOUS
Status: DISCONTINUED | OUTPATIENT
Start: 2022-09-23 | End: 2022-09-23 | Stop reason: HOSPADM

## 2022-09-23 RX ORDER — ONDANSETRON 2 MG/ML
INJECTION INTRAMUSCULAR; INTRAVENOUS AS NEEDED
Status: DISCONTINUED | OUTPATIENT
Start: 2022-09-23 | End: 2022-09-23

## 2022-09-23 RX ORDER — ONDANSETRON 2 MG/ML
4 INJECTION INTRAMUSCULAR; INTRAVENOUS EVERY 6 HOURS PRN
Status: DISCONTINUED | OUTPATIENT
Start: 2022-09-23 | End: 2022-09-23 | Stop reason: HOSPADM

## 2022-09-23 RX ORDER — MIDAZOLAM HYDROCHLORIDE 2 MG/2ML
INJECTION, SOLUTION INTRAMUSCULAR; INTRAVENOUS AS NEEDED
Status: DISCONTINUED | OUTPATIENT
Start: 2022-09-23 | End: 2022-09-23

## 2022-09-23 RX ORDER — SODIUM CHLORIDE 9 MG/ML
125 INJECTION, SOLUTION INTRAVENOUS CONTINUOUS
Status: DISCONTINUED | OUTPATIENT
Start: 2022-09-23 | End: 2022-09-23 | Stop reason: DRUGHIGH

## 2022-09-23 RX ORDER — DEXAMETHASONE SODIUM PHOSPHATE 10 MG/ML
INJECTION, SOLUTION INTRAMUSCULAR; INTRAVENOUS AS NEEDED
Status: DISCONTINUED | OUTPATIENT
Start: 2022-09-23 | End: 2022-09-23

## 2022-09-23 RX ORDER — PROPOFOL 10 MG/ML
INJECTION, EMULSION INTRAVENOUS CONTINUOUS PRN
Status: DISCONTINUED | OUTPATIENT
Start: 2022-09-23 | End: 2022-09-23

## 2022-09-23 RX ORDER — CEFAZOLIN SODIUM 2 G/50ML
2000 SOLUTION INTRAVENOUS ONCE
Status: COMPLETED | OUTPATIENT
Start: 2022-09-23 | End: 2022-09-23

## 2022-09-23 RX ORDER — SODIUM CHLORIDE, SODIUM LACTATE, POTASSIUM CHLORIDE, CALCIUM CHLORIDE 600; 310; 30; 20 MG/100ML; MG/100ML; MG/100ML; MG/100ML
INJECTION, SOLUTION INTRAVENOUS CONTINUOUS PRN
Status: DISCONTINUED | OUTPATIENT
Start: 2022-09-23 | End: 2022-09-23

## 2022-09-23 RX ORDER — PROPOFOL 10 MG/ML
INJECTION, EMULSION INTRAVENOUS AS NEEDED
Status: DISCONTINUED | OUTPATIENT
Start: 2022-09-23 | End: 2022-09-23

## 2022-09-23 RX ORDER — FENTANYL CITRATE 50 UG/ML
INJECTION, SOLUTION INTRAMUSCULAR; INTRAVENOUS AS NEEDED
Status: DISCONTINUED | OUTPATIENT
Start: 2022-09-23 | End: 2022-09-23

## 2022-09-23 RX ORDER — FENTANYL CITRATE/PF 50 MCG/ML
25 SYRINGE (ML) INJECTION
Status: CANCELLED | OUTPATIENT
Start: 2022-09-23

## 2022-09-23 RX ORDER — LIDOCAINE HYDROCHLORIDE 10 MG/ML
INJECTION, SOLUTION EPIDURAL; INFILTRATION; INTRACAUDAL; PERINEURAL AS NEEDED
Status: DISCONTINUED | OUTPATIENT
Start: 2022-09-23 | End: 2022-09-23

## 2022-09-23 RX ORDER — ONDANSETRON 2 MG/ML
4 INJECTION INTRAMUSCULAR; INTRAVENOUS ONCE AS NEEDED
Status: CANCELLED | OUTPATIENT
Start: 2022-09-23

## 2022-09-23 RX ORDER — LIDOCAINE HYDROCHLORIDE AND EPINEPHRINE 10; 10 MG/ML; UG/ML
INJECTION, SOLUTION INFILTRATION; PERINEURAL AS NEEDED
Status: DISCONTINUED | OUTPATIENT
Start: 2022-09-23 | End: 2022-09-23 | Stop reason: HOSPADM

## 2022-09-23 RX ORDER — KETOROLAC TROMETHAMINE 30 MG/ML
INJECTION, SOLUTION INTRAMUSCULAR; INTRAVENOUS AS NEEDED
Status: DISCONTINUED | OUTPATIENT
Start: 2022-09-23 | End: 2022-09-23

## 2022-09-23 RX ADMIN — PROPOFOL 200 MG: 10 INJECTION, EMULSION INTRAVENOUS at 08:31

## 2022-09-23 RX ADMIN — FENTANYL CITRATE 50 MCG: 50 INJECTION INTRAMUSCULAR; INTRAVENOUS at 08:39

## 2022-09-23 RX ADMIN — FENTANYL CITRATE 50 MCG: 50 INJECTION INTRAMUSCULAR; INTRAVENOUS at 08:35

## 2022-09-23 RX ADMIN — MIDAZOLAM 2 MG: 1 INJECTION INTRAMUSCULAR; INTRAVENOUS at 08:25

## 2022-09-23 RX ADMIN — PROPOFOL 180 MCG/KG/MIN: 10 INJECTION, EMULSION INTRAVENOUS at 08:33

## 2022-09-23 RX ADMIN — LIDOCAINE HYDROCHLORIDE 50 MG: 10 INJECTION, SOLUTION EPIDURAL; INFILTRATION; INTRACAUDAL; PERINEURAL at 08:31

## 2022-09-23 RX ADMIN — SODIUM CHLORIDE, SODIUM LACTATE, POTASSIUM CHLORIDE, AND CALCIUM CHLORIDE: .6; .31; .03; .02 INJECTION, SOLUTION INTRAVENOUS at 08:25

## 2022-09-23 RX ADMIN — DEXAMETHASONE SODIUM PHOSPHATE 10 MG: 10 INJECTION, SOLUTION INTRAMUSCULAR; INTRAVENOUS at 08:31

## 2022-09-23 RX ADMIN — KETOROLAC TROMETHAMINE 15 MG: 30 INJECTION, SOLUTION INTRAMUSCULAR at 08:58

## 2022-09-23 RX ADMIN — CEFAZOLIN SODIUM 2000 MG: 2 SOLUTION INTRAVENOUS at 08:35

## 2022-09-23 RX ADMIN — ONDANSETRON 4 MG: 2 INJECTION INTRAMUSCULAR; INTRAVENOUS at 08:31

## 2022-09-23 NOTE — DISCHARGE SUMMARY
Discharge Summary - Mahsa Reddy 33 y o  female MRN: [id number]    Unit/Bed#: OR POOL Encounter: [id number]    Admission Date:     Admitting Diagnosis: Moderate cervical dysplasia [N87 1]        Procedures Performed:  LEEP    Summary of Hospital Course: Patient was here for scheduled surgery  No issues and discharged home the same day    Significant Findings, Care, Treatment and Services Provided: none    Complications: none    Discharge Diagnosis: Moderate cervical dysplasia    Medical Problems             Resolved Problems  Date Reviewed: 10/28/2020   None                 Condition at Discharge: good         Discharge instructions/Information to patient and family:   See after visit summary for information provided to patient and family  Provisions for Follow-Up Care:  See after visit summary for information related to follow-up care and any pertinent home health orders  PCP: Dhruv Sutton MD    Disposition: Home    Planned Readmission: No      Discharge Statement   I spent 15 minutes discharging the patient  This time was spent on the day of discharge  I had direct contact with the patient on the day of discharge  Additional documentation is required if more than 30 minutes were spent on discharge  Discharge Medications:  See after visit summary for reconciled discharge medications provided to patient and family

## 2022-09-23 NOTE — DISCHARGE INSTR - AVS FIRST PAGE
Tylenol and advil for pain  No sex, tampons or douching for 3 weeks  Mild to moderate bleeding is normal  Regular diet  May resume all normal activities on Tuesday   For now no lifting more than 30 lbs  Schedule an appointment in 3 weeks

## 2022-09-23 NOTE — ANESTHESIA POSTPROCEDURE EVALUATION
Post-Op Assessment Note    CV Status:  Stable  Pain Score: 0    Pain management: adequate     Mental Status:  Alert and awake   Hydration Status:  Euvolemic   PONV Controlled:  Controlled   Airway Patency:  Patent      Post Op Vitals Reviewed: Yes      Staff: CRNA         No complications documented      /72 (09/23/22 0914)    Temp 98 3 °F (36 8 °C) (09/23/22 0914)    Pulse 88 (09/23/22 0914)   Resp 16 (09/23/22 0914)    SpO2  100

## 2022-09-23 NOTE — INTERVAL H&P NOTE
H&P reviewed  After examining the patient I find no changes in the patients condition since the H&P had been written      Vitals:    09/23/22 0720   BP: 126/76   Pulse: 80   Resp: 18   Temp: 98 2 °F (36 8 °C)   SpO2: 99%

## 2022-09-23 NOTE — OP NOTE
OPERATIVE REPORT  PATIENT NAME: Micheline Sin    :  1990  MRN: [id number]  Pt Location:  OR ROOM 02    SURGERY DATE: 2022    Surgeon(s) and Role:      Matt Dubois DO - Primary    Preop Diagnosis:  Moderate cervical dysplasia [N87 1]    Post-Op Diagnosis Codes: Moderate cervical dysplasia [N87 1]    Procedure(s) (LRB):  LOOP ELECTROSURGICAL EXCISION PROCEDURE (LEEP) (N/A)    Specimen(s):  ID Type Source Tests Collected by Time Destination   1 : endo cervical bx stitch at 6:00 Tissue Cervix, Endocervical TISSUE EXAM Matt Dubois ,  2022  8:42 AM    2 : endo cervical bx stitch at 12:00 Tissue Cervix, Endocervical TISSUE EXAM Matt Dubois ,  2022  8:43 AM        Estimated Blood Loss:   Minimal    Drains   No LDAs found     Anesthesia Type:   General    Operative Indications: Moderate cervical dysplasia [N87 1]      Operative Findings:  Normal appearing cervix  Small left side wall laceration, repaired with 2 0 vicryl    Complications:   None    Procedure and Technique:  The patient was taken to the operating room where general anesthesia was administered without difficulty  The patient was placed in the dorsolithotomy position with stirrups  An exam under anesthesia revealed a normal anteverted uterus  The patient was then prepared and draped in the normal sterile fashion  Speculum was inserted  Lidocaine with 2% epi was injected around cervix at 4, 8 and 12 oclock positions  The endocervix was removed in 2 areas, top and bottom with the LOOP device  The top part of cervix was suture ligated at 12 oclock position  The bottom portion was marked with suture at 6 oclock position  Both specimens were sent to pathology  Then bovie ball was used to burn and coagulate the endocervix  On exam patient was found to have a small left vaginal sidewall laceration  This was repaired with 2 0 vicryl suture  Hemostasis achieved       Hemostasis was achieved  Finally surgacel was inserted into vagina on the cervix to ensure hemostasis control  The patient tolerated procedure well  The instrument and sponge counts were correct x2  The patient was awakened from general anesthesia and taken to the recovery room in a stable condition  The patient will go home after recovery from anesthesia and meeting all the criteria for discharge  She is given instruction regarding a follow visit in two weeks in the office        Patient Disposition:  PACU         SIGNATURE: [unfilled]  DATE: September 23, 2022  TIME: 9:11 AM

## 2022-09-23 NOTE — ANESTHESIA PREPROCEDURE EVALUATION
Procedure:  LOOP ELECTROSURGICAL EXCISION PROCEDURE (LEEP) (N/A Cervix)    Relevant Problems   CARDIO   (+) Hyperlipidemia, unspecified      ENDO   (+) Hypothyroidism      Endocrine   (+) Thyroid nodule             Anesthesia Plan  ASA Score- 2     Anesthesia Type- general with ASA Monitors  Additional Monitors:   Airway Plan: LMA  Plan Factors-    Chart reviewed  Induction- intravenous  Postoperative Plan-     Informed Consent- Anesthetic plan and risks discussed with patient  I personally reviewed this patient with the CRNA  Discussed and agreed on the Anesthesia Plan with the CRNA  Lety Yu

## 2022-10-05 PROCEDURE — 88307 TISSUE EXAM BY PATHOLOGIST: CPT | Performed by: PATHOLOGY

## 2022-10-05 PROCEDURE — 88344 IMHCHEM/IMCYTCHM EA MLT ANTB: CPT | Performed by: PATHOLOGY

## 2022-10-12 NOTE — PROGRESS NOTES
Alia Cross 26 y o  female MRN: [id number]    Encounter: [id number]      Assessment/Plan     Problem List Items Addressed This Visit        Endocrine    Hashimoto's thyroiditis    Relevant Medications    levothyroxine 50 mcg tablet    Hypothyroidism - Primary     Continue levothyroxine at current dose , will check thyroid function tests         Relevant Medications    levothyroxine 50 mcg tablet    Other Relevant Orders    TSH, 3rd generation Lab Collect    T4, free Lab Collect       Other    Fatigue      Labs as ordered - likely multifactorial         Relevant Orders    CBC and differential Lab Collect (Completed)    Vitamin D 25 hydroxy Lab Collect (Completed)    Hyperlipidemia, unspecified     Repeat fasting lipid profile          Relevant Orders    Comprehensive metabolic panel Lab Collect    Lipid Panel with Direct LDL reflex Lab Collect      Other Visit Diagnoses     Elevated LFTs        Relevant Orders    Hepatic function panel Lab Collect (Completed)        CC:   Hypothyroidism     History of Present Illness     HPI:  27 y/o female with hypothyroidism seen in follow up   She states that she is a Stress eater - Gained a few lbs since last visit     For hypothyroidism she is onLT4 50 mcg 1 tab daily and 2 on sat and Sunday - takes regularly and properly     C/o fatigue , sleep disturbances , had  hair loss - improving recently   No constipation       Review of Systems    Historical Information   Past Medical History:   Diagnosis Date    Hashimoto's disease     Stress headaches      Past Surgical History:   Procedure Laterality Date    NOSE SURGERY      ROTATOR CUFF REPAIR Right     TONSILLECTOMY       Social History   Social History     Substance and Sexual Activity   Alcohol Use Yes    Comment: social intake     Social History     Substance and Sexual Activity   Drug Use No     Social History     Tobacco Use   Smoking Status Never Smoker   Smokeless Tobacco Never Used     Family History:   Family History   Problem Relation Age of Onset    Thyroid disease unspecified Sister     Thyroid disease unspecified Maternal Aunt     Breast cancer Maternal Grandmother     Thyroid disease unspecified Maternal Grandmother     Diabetes unspecified Maternal Grandfather        Meds/Allergies   Current Outpatient Medications   Medication Sig Dispense Refill    Ascorbic Acid (vitamin C) 1000 MG tablet Take 1,000 mg by mouth daily      cyclobenzaprine (FLEXERIL) 10 mg tablet Take 10 mg by mouth 3 (three) times a day as needed for muscle spasms      drospirenone-ethinyl estradiol (OCELLA) 3-0 03 MG per tablet Take 1 tablet by mouth daily      levothyroxine 50 mcg tablet 1 tab mon-fri and 2 on sat and Sunday 120 tablet 4    Melatonin 10 MG TABS Take by mouth      Multiple Vitamins-Minerals (MULTIVITAMIN GUMMIES ADULT) CHEW Chew      Omega-3 Fatty Acids (OMEGA-3 FISH OIL PO) Take by mouth       No current facility-administered medications for this visit  Allergies   Allergen Reactions    Tussin [Guaifenesin] Hives     Tussi-12 reaction in childhood (hives)       Objective   Vitals: Blood pressure 118/84, pulse 92, height 5' 5" (1 651 m), weight 86 3 kg (190 lb 3 2 oz)  Physical Exam  Vitals signs reviewed  Constitutional:       Appearance: Normal appearance  She is obese  She is not ill-appearing or diaphoretic  HENT:      Head: Normocephalic and atraumatic  Eyes:      General: No scleral icterus  Extraocular Movements: Extraocular movements intact  Neck:      Musculoskeletal: Neck supple  Cardiovascular:      Rate and Rhythm: Regular rhythm  Pulses: Normal pulses  Heart sounds: Normal heart sounds  No murmur  Pulmonary:      Effort: Pulmonary effort is normal  No respiratory distress  Breath sounds: Normal breath sounds  No wheezing or rales  Abdominal:      General: Abdomen is flat  There is no distension  Palpations: Abdomen is soft  Tenderness:  There is no abdominal tenderness  There is no guarding  Musculoskeletal:      Right lower leg: No edema  Left lower leg: No edema  Lymphadenopathy:      Cervical: No cervical adenopathy  Skin:     General: Skin is warm and dry  Neurological:      General: No focal deficit present  Mental Status: She is alert and oriented to person, place, and time  Psychiatric:         Mood and Affect: Mood normal          Behavior: Behavior normal          The history was obtained from the review of the chart, patient  Lab Results:   Lab Results   Component Value Date/Time    TSH 3RD GENERATON 2 140 04/29/2021 07:22 AM    TSH 3RD GENERATON 1 840 12/18/2020 09:18 AM    TSH 3RD GENERATON 0 919 06/28/2020 08:37 AM    Free T4 1 19 04/29/2021 07:22 AM    Free T4 1 09 12/18/2020 09:18 AM    Free T4 1 44 06/28/2020 08:37 AM       Imaging Studies:   Results for orders placed during the hospital encounter of 10/11/19   US thyroid    Impression 1  Heterogeneous appearance of the thyroid gland in keeping with a #the side that is  2   Previously described nodule within the isthmus is favored to represent a juxta thyroidal lymph node  Reference: ACR Thyroid Imaging, Reporting and Data System (TI-RADS): White Paper of the [employer]  J AM Karon Radiol 0448;30:081-352  (additional recommendations based on American Thyroid Association 2015 guidelines )      Workstation performed: [id number]         I have personally reviewed pertinent reports  Portions of the record may have been created with voice recognition software  Occasional wrong word or "sound a like" substitutions may have occurred due to the inherent limitations of voice recognition software  Read the chart carefully and recognize, using context, where substitutions have occurred  Normal Normal Normal Normal Normal Normal Normal Normal

## 2022-10-30 ENCOUNTER — NURSE TRIAGE (OUTPATIENT)
Dept: OTHER | Facility: OTHER | Age: 32
End: 2022-10-30

## 2022-10-30 NOTE — TELEPHONE ENCOUNTER
Patient tested positive for paxlovid  Symptoms started 10/26  Patient made aware that she is outside the window for paxlovid, and that PCP is not in network  Patient advised to follow up with PCP  Reason for Disposition  • Health Information question, no triage required and triager able to answer question    Answer Assessment - Initial Assessment Questions  1   REASON FOR CALL or QUESTION: "What is your reason for calling today?" or "How can I best help you?" or "What question do you have that I can help answer?"      " I would like to know if I can be prescribed paxlovid for my covid symptoms "    Protocols used: INFORMATION ONLY CALL - NO TRIAGE-[facility]

## 2022-10-30 NOTE — TELEPHONE ENCOUNTER
Regarding: Covid positive with sore throat,cough,and loss of scent  ----- Message from Johanny Higgins sent at 10/30/2022  1:01 PM EDT -----  I tested positive for covid today  I have a sore throat, cough, stuffy nose, and loss scent   I would like to know if I can get the Paxlovid "

## 2022-12-10 ENCOUNTER — APPOINTMENT (OUTPATIENT)
Dept: LAB | Facility: HOSPITAL | Age: 32
End: 2022-12-10
Attending: INTERNAL MEDICINE

## 2022-12-10 DIAGNOSIS — E06.3 HYPOTHYROIDISM DUE TO HASHIMOTO'S THYROIDITIS: ICD-10-CM

## 2022-12-10 DIAGNOSIS — E03.8 HYPOTHYROIDISM DUE TO HASHIMOTO'S THYROIDITIS: ICD-10-CM

## 2022-12-10 DIAGNOSIS — R73.01 IMPAIRED FASTING GLUCOSE: ICD-10-CM

## 2022-12-10 LAB
ALBUMIN SERPL BCP-MCNC: 3.1 G/DL (ref 3.5–5)
ALP SERPL-CCNC: 84 U/L (ref 46–116)
ALT SERPL W P-5'-P-CCNC: 22 U/L (ref 12–78)
ANION GAP SERPL CALCULATED.3IONS-SCNC: 9 MMOL/L (ref 4–13)
AST SERPL W P-5'-P-CCNC: 14 U/L (ref 5–45)
BILIRUB SERPL-MCNC: 0.5 MG/DL (ref 0.2–1)
BUN SERPL-MCNC: 12 MG/DL (ref 5–25)
CALCIUM ALBUM COR SERPL-MCNC: 9.7 MG/DL (ref 8.3–10.1)
CALCIUM SERPL-MCNC: 9 MG/DL (ref 8.3–10.1)
CHLORIDE SERPL-SCNC: 105 MMOL/L (ref 96–108)
CO2 SERPL-SCNC: 22 MMOL/L (ref 21–32)
CREAT SERPL-MCNC: 0.8 MG/DL (ref 0.6–1.3)
GFR SERPL CREATININE-BSD FRML MDRD: 97 ML/MIN/1.73SQ M
GLUCOSE P FAST SERPL-MCNC: 87 MG/DL (ref 65–99)
POTASSIUM SERPL-SCNC: 3.9 MMOL/L (ref 3.5–5.3)
PROT SERPL-MCNC: 7 G/DL (ref 6.4–8.4)
SODIUM SERPL-SCNC: 136 MMOL/L (ref 135–147)
T4 FREE SERPL-MCNC: 1.02 NG/DL (ref 0.76–1.46)
TSH SERPL DL<=0.05 MIU/L-ACNC: 1.79 UIU/ML (ref 0.45–4.5)

## 2022-12-11 LAB
EST. AVERAGE GLUCOSE BLD GHB EST-MCNC: 94 MG/DL
HBA1C MFR BLD: 4.9 %

## 2022-12-12 ENCOUNTER — OFFICE VISIT (OUTPATIENT)
Dept: ENDOCRINOLOGY | Facility: CLINIC | Age: 32
End: 2022-12-12

## 2022-12-12 VITALS
BODY MASS INDEX: 27.12 KG/M2 | DIASTOLIC BLOOD PRESSURE: 70 MMHG | SYSTOLIC BLOOD PRESSURE: 110 MMHG | HEIGHT: 65 IN | HEART RATE: 77 BPM | WEIGHT: 162.8 LBS

## 2022-12-12 DIAGNOSIS — E03.8 OTHER SPECIFIED HYPOTHYROIDISM: ICD-10-CM

## 2022-12-12 DIAGNOSIS — E04.1 THYROID NODULE: ICD-10-CM

## 2022-12-12 DIAGNOSIS — E78.5 HYPERLIPIDEMIA, UNSPECIFIED HYPERLIPIDEMIA TYPE: ICD-10-CM

## 2022-12-12 DIAGNOSIS — R73.01 IMPAIRED FASTING GLUCOSE: ICD-10-CM

## 2022-12-12 DIAGNOSIS — E03.8 HYPOTHYROIDISM DUE TO HASHIMOTO'S THYROIDITIS: Primary | ICD-10-CM

## 2022-12-12 DIAGNOSIS — E06.3 HYPOTHYROIDISM DUE TO HASHIMOTO'S THYROIDITIS: Primary | ICD-10-CM

## 2022-12-12 RX ORDER — FLUTICASONE PROPIONATE 50 MCG
2 SPRAY, SUSPENSION (ML) NASAL DAILY
COMMUNITY
Start: 2022-12-05

## 2022-12-12 RX ORDER — LEVOTHYROXINE SODIUM 0.05 MG/1
TABLET ORAL
Qty: 120 TABLET | Refills: 4 | Status: SHIPPED | OUTPATIENT
Start: 2022-12-12

## 2022-12-12 NOTE — ASSESSMENT & PLAN NOTE
This has improved- Fasting glucose normal at 87 and A1C 4 9  She has lost about 10 pounds since the last visit  Antibody testing for type 1 Diabetes pending, will call when results are available but expect this to  be normal as glucose has improved with weight loss

## 2022-12-12 NOTE — PROGRESS NOTES
Established Patient Progress Note       Chief Complaint   Patient presents with   • Thyroid Problem        History of Present Illness:     Chemo Bourne is a 33 y o  female with a history of hypothyroidism due to hashimoto's thyroiditis and thyroid nodule here for follow up  For hypothyroidism, taking levothyroxine 50mcg mon-Fri, 2 tabs on Sat/SunSunday  Overall she is feeling well       For thyroid nodule, most recent ultrasound in 10/2019 was stable, but reported this is likely a lymph node not a thyroid nodule       For obesity/dyslipidemia/impaired fasting glucose, she has made lifestyle modifications  She has lost about 10 pounds since last visit  She has a puppy and has been walking a lot more  Patient Active Problem List   Diagnosis   • Elevated TSH   • Fatigue   • Hashimoto's thyroiditis   • Hyperlipidemia, unspecified   • Hypothyroidism   • Overweight   • Thyroid nodule   • Impaired fasting glucose      Past Medical History:   Diagnosis Date   • Anxiety    • Cervical dysplasia    • Disease of thyroid gland    • Hashimoto's disease    • Stress headaches       Past Surgical History:   Procedure Laterality Date   • COLPOSCOPY      times 2   • NOSE SURGERY     • [place] CONIZATION CERVIX W/WO D&C RPR ELTRD EXC N/A 9/23/2022    Procedure: LOOP ELECTROSURGICAL EXCISION PROCEDURE (LEEP);   Surgeon: Tanisha Colmenares DO;  Location:  MAIN OR;  Service: Gynecology   • ROTATOR CUFF REPAIR Right    • TONSILLECTOMY     • WISDOM TOOTH EXTRACTION        Family History   Problem Relation Age of Onset   • Insulin resistance Mother    • Thyroid disease unspecified Sister    • Thyroid disease unspecified Maternal Aunt    • Breast cancer Maternal Grandmother    • Thyroid disease unspecified Maternal Grandmother    • Diabetes unspecified Maternal Grandfather      Social History     Tobacco Use   • Smoking status: Never   • Smokeless tobacco: Never   Substance Use Topics   • Alcohol use: Yes     Comment: social intake     Allergies   Allergen Reactions   • Sudafed [Pseudoephedrine] Other (See Comments)     Pt states it causes panic attacks   • Tussin [Guaifenesin] Hives     Tussi-12 reaction in childhood (hives)       Current Outpatient Medications:   •  Ascorbic Acid (vitamin C) 1000 MG tablet, Take 1,000 mg by mouth daily, Disp: , Rfl:   •  Cholecalciferol (Vitamin D3) 50 MCG (2000 UT) TABS, Take 1,000 Units by mouth daily, Disp: , Rfl:   •  drospirenone-ethinyl estradiol (KIMBERLEY) 3-0 03 MG per tablet, Take 1 tablet by mouth daily, Disp: , Rfl:   •  fluticasone (FLONASE) 50 mcg/act nasal spray, 2 sprays daily, Disp: , Rfl:   •  levothyroxine 50 mcg tablet, 1 tab mon-fri and 2 on sat and Sunday, Disp: 120 tablet, Rfl: 4  •  Melatonin 10 MG TABS, Take by mouth if needed, Disp: , Rfl:   •  NON FORMULARY, Nifedipine 10% cream, Disp: , Rfl:   •  Probiotic Product (Align) 4 MG CAPS, Take 1 capsule by mouth in the morning, Disp: , Rfl:   •  sertraline (ZOLOFT) 50 mg tablet, , Disp: , Rfl:   •  Zinc 30 MG TABS, Take 1 tablet by mouth in the morning, Disp: , Rfl:     Review of Systems   Constitutional: Negative for activity change, appetite change, chills, diaphoresis, fatigue, fever and unexpected weight change  HENT: Negative for trouble swallowing and voice change  Eyes: Negative for visual disturbance  Respiratory: Negative for shortness of breath  Cardiovascular: Negative for chest pain and palpitations  Gastrointestinal: Negative for abdominal pain, constipation and diarrhea  Endocrine: Negative for cold intolerance, heat intolerance, polydipsia, polyphagia and polyuria  Genitourinary: Negative for frequency and menstrual problem  Musculoskeletal: Negative for arthralgias and myalgias  Skin: Negative for rash  Allergic/Immunologic: Negative for food allergies  Neurological: Negative for dizziness and tremors  Hematological: Negative for adenopathy     Psychiatric/Behavioral: Negative for sleep disturbance  All other systems reviewed and are negative  Physical Exam:  Body mass index is 27 09 kg/m²  /70   Pulse 77   Ht 5' 5" (1 651 m)   Wt 73 8 kg (162 lb 12 8 oz)   BMI 27 09 kg/m²    Wt Readings from Last 3 Encounters:   12/12/22 73 8 kg (162 lb 12 8 oz)   09/23/22 78 5 kg (173 lb)   06/03/22 78 5 kg (173 lb 2 oz)       Physical Exam  Vitals reviewed  Constitutional:       General: She is not in acute distress  Appearance: She is well-developed  HENT:      Head: Normocephalic and atraumatic  Eyes:      Conjunctiva/sclera: Conjunctivae normal       Pupils: Pupils are equal, round, and reactive to light  Neck:      Thyroid: No thyromegaly  Cardiovascular:      Rate and Rhythm: Normal rate and regular rhythm  Heart sounds: Normal heart sounds  Pulmonary:      Effort: Pulmonary effort is normal  No respiratory distress  Breath sounds: Normal breath sounds  No wheezing or rales  Abdominal:      General: Bowel sounds are normal  There is no distension  Palpations: Abdomen is soft  Tenderness: There is no abdominal tenderness  Musculoskeletal:         General: Normal range of motion  Cervical back: Normal range of motion and neck supple  Skin:     General: Skin is warm and dry  Neurological:      Mental Status: She is alert and oriented to person, place, and time           Labs:   Component      Latest Ref Rng & Units 5/11/2021 11/17/2021           7:15 AM  8:16 AM   Sodium      135 - 147 mmol/L  134 (L)   Potassium      3 5 - 5 3 mmol/L  4 0   Chloride      96 - 108 mmol/L  98 (L)   CO2      21 - 32 mmol/L  27   Anion Gap      4 - 13 mmol/L  9   BUN      5 - 25 mg/dL  9   Creatinine      0 60 - 1 30 mg/dL  0 99   GLUCOSE FASTING      65 - 99 mg/dL  100 (H)   Calcium      8 3 - 10 1 mg/dL  9 3   CORRECTED CALCIUM      8 3 - 10 1 mg/dL     AST      5 - 45 U/L  20   ALT      12 - 78 U/L  48   Alkaline Phosphatase      46 - 116 U/L  89   Total Protein      6 4 - 8 4 g/dL  7 9   Albumin      3 5 - 5 0 g/dL  4 0   TOTAL BILIRUBIN      0 20 - 1 00 mg/dL  0 76   eGFR      ml/min/1 73sq m  76   Cholesterol      50 - 200 mg/dL     Triglycerides      <=150 mg/dL     HDL      >=40 mg/dL     LDL Calculated      0 - 100 mg/dL     Hemoglobin A1C      Normal 3 8-5 6%; PreDiabetic 5 7-6 4%; Diabetic >=6 5%; Glycemic control for adults with diabetes <7 0% %     eAG, EST AVG Glucose      mg/dl     Vit D, 25-Hydroxy      30 0 - 100 0 ng/mL 34 1    Free T4      0 76 - 1 46 ng/dL     TSH 3RD GENERATON      0 450 - 4 500 uIU/mL       Component      Latest Ref Rng & Units 11/17/2021 11/17/2021 6/1/2022           8:16 AM  8:16 AM  7:19 AM   Sodium      135 - 147 mmol/L      Potassium      3 5 - 5 3 mmol/L      Chloride      96 - 108 mmol/L      CO2      21 - 32 mmol/L      Anion Gap      4 - 13 mmol/L      BUN      5 - 25 mg/dL      Creatinine      0 60 - 1 30 mg/dL      GLUCOSE FASTING      65 - 99 mg/dL      Calcium      8 3 - 10 1 mg/dL      CORRECTED CALCIUM      8 3 - 10 1 mg/dL      AST      5 - 45 U/L      ALT      12 - 78 U/L      Alkaline Phosphatase      46 - 116 U/L      Total Protein      6 4 - 8 4 g/dL      Albumin      3 5 - 5 0 g/dL      TOTAL BILIRUBIN      0 20 - 1 00 mg/dL      eGFR      ml/min/1 73sq m      Cholesterol      50 - 200 mg/dL 223 (H)     Triglycerides      <=150 mg/dL 139     HDL      >=40 mg/dL 77     LDL Calculated      0 - 100 mg/dL 118 (H)     Hemoglobin A1C      Normal 3 8-5 6%; PreDiabetic 5 7-6 4%;  Diabetic >=6 5%; Glycemic control for adults with diabetes <7 0% %      eAG, EST AVG Glucose      mg/dl      Vit D, 25-Hydroxy      30 0 - 100 0 ng/mL      Free T4      0 76 - 1 46 ng/dL  1 20    TSH 3RD GENERATON      0 450 - 4 500 uIU/mL   1 667     Component      Latest Ref Rng & Units 12/10/2022 12/10/2022           9:00 AM  9:01 AM   Sodium      135 - 147 mmol/L 136    Potassium      3 5 - 5 3 mmol/L 3 9    Chloride      96 - 108 mmol/L 105    CO2      21 - 32 mmol/L 22    Anion Gap      4 - 13 mmol/L 9    BUN      5 - 25 mg/dL 12    Creatinine      0 60 - 1 30 mg/dL 0 80    GLUCOSE FASTING      65 - 99 mg/dL 87    Calcium      8 3 - 10 1 mg/dL 9 0    CORRECTED CALCIUM      8 3 - 10 1 mg/dL 9 7    AST      5 - 45 U/L 14    ALT      12 - 78 U/L 22    Alkaline Phosphatase      46 - 116 U/L 84    Total Protein      6 4 - 8 4 g/dL 7 0    Albumin      3 5 - 5 0 g/dL 3 1 (L)    TOTAL BILIRUBIN      0 20 - 1 00 mg/dL 0 50    eGFR      ml/min/1 73sq m 97    Cholesterol      50 - 200 mg/dL     Triglycerides      <=150 mg/dL     HDL      >=40 mg/dL     LDL Calculated      0 - 100 mg/dL     Hemoglobin A1C      Normal 3 8-5 6%; PreDiabetic 5 7-6 4%; Diabetic >=6 5%; Glycemic control for adults with diabetes <7 0% %     eAG, EST AVG Glucose      mg/dl     Vit D, 25-Hydroxy      30 0 - 100 0 ng/mL     Free T4      0 76 - 1 46 ng/dL     TSH 3RD GENERATON      0 450 - 4 500 uIU/mL  1 786     Component      Latest Ref Rng & Units 12/10/2022 12/10/2022           9:01 AM  9:01 AM   Sodium      135 - 147 mmol/L     Potassium      3 5 - 5 3 mmol/L     Chloride      96 - 108 mmol/L     CO2      21 - 32 mmol/L     Anion Gap      4 - 13 mmol/L     BUN      5 - 25 mg/dL     Creatinine      0 60 - 1 30 mg/dL     GLUCOSE FASTING      65 - 99 mg/dL     Calcium      8 3 - 10 1 mg/dL     CORRECTED CALCIUM      8 3 - 10 1 mg/dL     AST      5 - 45 U/L     ALT      12 - 78 U/L     Alkaline Phosphatase      46 - 116 U/L     Total Protein      6 4 - 8 4 g/dL     Albumin      3 5 - 5 0 g/dL     TOTAL BILIRUBIN      0 20 - 1 00 mg/dL     eGFR      ml/min/1 73sq m     Cholesterol      50 - 200 mg/dL     Triglycerides      <=150 mg/dL     HDL      >=40 mg/dL     LDL Calculated      0 - 100 mg/dL     Hemoglobin A1C      Normal 3 8-5 6%; PreDiabetic 5 7-6 4%;  Diabetic >=6 5%; Glycemic control for adults with diabetes <7 0% %  4 9   eAG, EST AVG Glucose      mg/dl  94   Vit D, 25-Hydroxy      30 0 - 100 0 ng/mL     Free T4      0 76 - 1 46 ng/dL 1 02    TSH 3RD GENERATON      0 450 - 4 500 uIU/mL           Impression & Plan:    Problem List Items Addressed This Visit        Endocrine    Hypothyroidism - Primary     TSH at goal  Continue levothyroxine  Relevant Medications    levothyroxine 50 mcg tablet    Other Relevant Orders    TSH, 3rd generation    T4, free    Thyroid nodule     2019 ultrasound showed small/stable nodule vs lymph node, no additional testing needed  Relevant Medications    levothyroxine 50 mcg tablet    Impaired fasting glucose     This has improved- Fasting glucose normal at 87 and A1C 4 9  She has lost about 10 pounds since the last visit  Antibody testing for type 1 Diabetes pending, will call when results are available but expect this to  be normal as glucose has improved with weight loss  Relevant Orders    Hemoglobin A1C       Other    Hyperlipidemia, unspecified     Continue lifestyle modification and weight loss  Relevant Orders    Comprehensive metabolic panel    Lipid Panel with Direct LDL reflex       Orders Placed This Encounter   Procedures   • Hemoglobin A1C     Standing Status:   Future     Standing Expiration Date:   6/12/2024   • Comprehensive metabolic panel     This is a patient instruction: Patient fasting for 8 hours or longer recommended  Standing Status:   Future     Standing Expiration Date:   6/12/2024   • Lipid Panel with Direct LDL reflex     This is a patient instruction: This test requires patient fasting for 10-12 hours or longer  Drinking of black coffee or black tea is acceptable  Standing Status:   Future     Standing Expiration Date:   6/12/2024   • TSH, 3rd generation     This is a patient instruction: This test is non-fasting  Please drink two glasses of water morning of bloodwork          Standing Status:   Future     Standing Expiration Date:   6/12/2024   • T4, free     Standing Status: Future     Standing Expiration Date:   6/12/2024       There are no Patient Instructions on file for this visit  Discussed with the patient and all questioned fully answered  She will call me if any problems arise  Follow-up appointment in 12 months       Counseled patient on diagnostic results, prognosis, risk and benefit of treatment options, instruction for management, importance of treatment compliance, Risk  factor reduction and impressions      Aneta Closs, PA-C

## 2022-12-13 LAB — GAD65 AB SER-ACNC: <5 U/ML (ref 0–5)

## 2022-12-17 LAB — ISLET CELL512 AB SER-ACNC: <7.5 U/ML

## 2023-08-14 ENCOUNTER — APPOINTMENT (OUTPATIENT)
Dept: RADIOLOGY | Facility: MEDICAL CENTER | Age: 33
End: 2023-08-14
Payer: COMMERCIAL

## 2023-08-14 DIAGNOSIS — M54.50 LOW BACK PAIN, UNSPECIFIED BACK PAIN LATERALITY, UNSPECIFIED CHRONICITY, UNSPECIFIED WHETHER SCIATICA PRESENT: ICD-10-CM

## 2023-08-14 PROCEDURE — 72170 X-RAY EXAM OF PELVIS: CPT

## 2023-08-14 PROCEDURE — 72110 X-RAY EXAM L-2 SPINE 4/>VWS: CPT

## 2023-12-11 ENCOUNTER — APPOINTMENT (OUTPATIENT)
Dept: LAB | Facility: HOSPITAL | Age: 33
End: 2023-12-11
Payer: COMMERCIAL

## 2023-12-11 DIAGNOSIS — E03.8 HYPOTHYROIDISM DUE TO HASHIMOTO'S THYROIDITIS: ICD-10-CM

## 2023-12-11 DIAGNOSIS — R73.01 IMPAIRED FASTING GLUCOSE: ICD-10-CM

## 2023-12-11 DIAGNOSIS — E78.5 HYPERLIPIDEMIA, UNSPECIFIED HYPERLIPIDEMIA TYPE: ICD-10-CM

## 2023-12-11 DIAGNOSIS — E06.3 HYPOTHYROIDISM DUE TO HASHIMOTO'S THYROIDITIS: ICD-10-CM

## 2023-12-11 LAB
ALBUMIN SERPL BCP-MCNC: 4.2 G/DL (ref 3.5–5)
ALP SERPL-CCNC: 63 U/L (ref 34–104)
ALT SERPL W P-5'-P-CCNC: 13 U/L (ref 7–52)
ANION GAP SERPL CALCULATED.3IONS-SCNC: 8 MMOL/L
AST SERPL W P-5'-P-CCNC: 12 U/L (ref 13–39)
BILIRUB SERPL-MCNC: 0.52 MG/DL (ref 0.2–1)
BUN SERPL-MCNC: 14 MG/DL (ref 5–25)
CALCIUM SERPL-MCNC: 9.2 MG/DL (ref 8.4–10.2)
CHLORIDE SERPL-SCNC: 104 MMOL/L (ref 96–108)
CHOLEST SERPL-MCNC: 178 MG/DL
CO2 SERPL-SCNC: 24 MMOL/L (ref 21–32)
CREAT SERPL-MCNC: 0.79 MG/DL (ref 0.6–1.3)
EST. AVERAGE GLUCOSE BLD GHB EST-MCNC: 103 MG/DL
GFR SERPL CREATININE-BSD FRML MDRD: 97 ML/MIN/1.73SQ M
GLUCOSE P FAST SERPL-MCNC: 97 MG/DL (ref 65–99)
HBA1C MFR BLD: 5.2 %
HDLC SERPL-MCNC: 76 MG/DL
LDLC SERPL CALC-MCNC: 83 MG/DL (ref 0–100)
POTASSIUM SERPL-SCNC: 3.8 MMOL/L (ref 3.5–5.3)
PROT SERPL-MCNC: 7.1 G/DL (ref 6.4–8.4)
SODIUM SERPL-SCNC: 136 MMOL/L (ref 135–147)
T4 FREE SERPL-MCNC: 0.68 NG/DL (ref 0.61–1.12)
TRIGL SERPL-MCNC: 97 MG/DL
TSH SERPL DL<=0.05 MIU/L-ACNC: 1.91 UIU/ML (ref 0.45–4.5)

## 2023-12-11 PROCEDURE — 36415 COLL VENOUS BLD VENIPUNCTURE: CPT

## 2023-12-11 PROCEDURE — 80061 LIPID PANEL: CPT

## 2023-12-11 PROCEDURE — 84439 ASSAY OF FREE THYROXINE: CPT

## 2023-12-11 PROCEDURE — 83036 HEMOGLOBIN GLYCOSYLATED A1C: CPT

## 2023-12-11 PROCEDURE — 80053 COMPREHEN METABOLIC PANEL: CPT

## 2023-12-11 PROCEDURE — 84443 ASSAY THYROID STIM HORMONE: CPT

## 2023-12-13 ENCOUNTER — OFFICE VISIT (OUTPATIENT)
Dept: ENDOCRINOLOGY | Facility: CLINIC | Age: 33
End: 2023-12-13
Payer: COMMERCIAL

## 2023-12-13 VITALS
WEIGHT: 179 LBS | BODY MASS INDEX: 29.82 KG/M2 | OXYGEN SATURATION: 99 % | HEIGHT: 65 IN | SYSTOLIC BLOOD PRESSURE: 110 MMHG | HEART RATE: 80 BPM | DIASTOLIC BLOOD PRESSURE: 76 MMHG

## 2023-12-13 DIAGNOSIS — R73.01 IMPAIRED FASTING GLUCOSE: ICD-10-CM

## 2023-12-13 DIAGNOSIS — E06.3 HYPOTHYROIDISM DUE TO HASHIMOTO'S THYROIDITIS: Primary | ICD-10-CM

## 2023-12-13 DIAGNOSIS — E78.5 HYPERLIPIDEMIA, UNSPECIFIED HYPERLIPIDEMIA TYPE: ICD-10-CM

## 2023-12-13 DIAGNOSIS — E03.8 HYPOTHYROIDISM DUE TO HASHIMOTO'S THYROIDITIS: Primary | ICD-10-CM

## 2023-12-13 PROCEDURE — 99214 OFFICE O/P EST MOD 30 MIN: CPT | Performed by: STUDENT IN AN ORGANIZED HEALTH CARE EDUCATION/TRAINING PROGRAM

## 2023-12-13 NOTE — PROGRESS NOTES
Diandra Caballero 27 y.o. female MRN: [id number]    Encounter: [id number]      Assessment/Plan     Hypothyroidism - well controlled on current Rx LT4. Will continue. Arrange for 12-mo follow up with repeat studies. Although Supa Wheeler is on OCP, she is of reproductive age. I advised her that if pregnancy is ever diagnosed, changes in thyroid hormone dosing would be necessary. Elevated fasting glucose - A1c testing is in normal range. Fasting BG on plasma testing also in normal range on last test. Will continue to monitor A1c on yearly interval for now. Mixed hyperlipidemia - lipid panel is in normal range following lifestyle, dietary interventions. Problem List Items Addressed This Visit     Hyperlipidemia, unspecified    Hypothyroidism - Primary    Relevant Orders    TSH, 3rd generation Lab Collect    T4, free Lab Collect    Comprehensive metabolic panel Lab Collect    Lipid Panel with Direct LDL reflex Lab Collect    Impaired fasting glucose    Relevant Orders    HEMOGLOBIN A1C W/ EAG ESTIMATION Lab Collect     RTC 12-mo    CC: hypothyroidism    History of Present Illness     HPI:    Supa Wheeler presents today in follow up of hypothyroidism and thyroid nodule. This is her first visit in my office. She is transferring care from [facility] due to proximity. Supa Wheeler has several year history of hypothyroidism. She reports improvement of symptoms of fatigue, weight gain, and hair loss with therapy. She is presently taking LT4 50 mcg Mo - Fri, 100 mcg Sa - Sun. She takes LT4 as prescribed while fasting, waiting 1-h prior to first meal. She denies hyperthyroid symptoms. She reports some fatigue, but it is not excessive. She has been maintaining an active lifestyle and is happy to see that her blood test results are in normal range. She denies any neck complaints. Supa Wheeler is presently on OCP. She reports no intention for future pregnancy. Review of Systems   Constitutional:  Positive for fatigue.  Negative for unexpected weight change. HENT:  Negative for trouble swallowing and voice change. Gastrointestinal: Negative. Endocrine: Negative for polydipsia and polyuria. Neurological:  Negative for tremors. All other systems reviewed and are negative. Historical Information   Past Medical History:   Diagnosis Date   • Anxiety    • Cervical dysplasia    • Disease of thyroid gland    • Hashimoto's disease    • Stress headaches      Past Surgical History:   Procedure Laterality Date   • COLPOSCOPY      times 2   • NOSE SURGERY     • [place] CONIZATION CERVIX W/WO D&C RPR ELTRD EXC N/A 9/23/2022    Procedure: LOOP ELECTROSURGICAL EXCISION PROCEDURE (LEEP);   Surgeon: Huma Pepe., DO;  Location:  MAIN OR;  Service: Gynecology   • ROTATOR CUFF REPAIR Right    • TONSILLECTOMY     • WISDOM TOOTH EXTRACTION       Social History   Social History     Substance and Sexual Activity   Alcohol Use Yes    Comment: social intake     Social History     Substance and Sexual Activity   Drug Use No     Social History     Tobacco Use   Smoking Status Never   Smokeless Tobacco Never     Family History:   Family History   Problem Relation Age of Onset   • Insulin resistance Mother    • Thyroid disease unspecified Sister    • Thyroid disease unspecified Maternal Aunt    • Breast cancer Maternal Grandmother    • Thyroid disease unspecified Maternal Grandmother    • Diabetes unspecified Maternal Grandfather        Meds/Allergies   Current Outpatient Medications   Medication Sig Dispense Refill   • Ascorbic Acid (vitamin C) 1000 MG tablet Take 1,000 mg by mouth daily     • Cholecalciferol (Vitamin D3) 50 MCG (2000 UT) TABS Take 1,000 Units by mouth daily     • drospirenone-ethinyl estradiol (KIMBERLEY) 3-0.03 MG per tablet Take 1 tablet by mouth daily     • fluticasone (FLONASE) 50 mcg/act nasal spray 2 sprays daily     • levothyroxine 50 mcg tablet 1 tab mon-fri and 2 on sat and Sunday 120 tablet 4   • Melatonin 10 MG TABS Take by mouth if needed     • NON FORMULARY Nifedipine 10% cream     • Probiotic Product (Align) 4 MG CAPS Take 1 capsule by mouth in the morning     • sertraline (ZOLOFT) 50 mg tablet      • Zinc 30 MG TABS Take 1 tablet by mouth in the morning       No current facility-administered medications for this visit. Allergies   Allergen Reactions   • Sudafed [Pseudoephedrine] Other (See Comments)     Pt states it causes panic attacks   • Tussin [Guaifenesin] Hives     Tussi-12 reaction in childhood (hives)       Objective   Vitals: Blood pressure 110/76, pulse 80, height 5' 5" (1.651 m), weight 81.2 kg (179 lb), SpO2 99%. Physical Exam  Vitals reviewed. Constitutional:       Appearance: Normal appearance. HENT:      Head: Normocephalic and atraumatic. Nose: Nose normal.   Eyes:      General: No scleral icterus. Conjunctiva/sclera: Conjunctivae normal.   Neck:      Thyroid: No thyroid mass, thyromegaly or thyroid tenderness. Cardiovascular:      Rate and Rhythm: Normal rate and regular rhythm. Pulmonary:      Effort: Pulmonary effort is normal. No respiratory distress. Musculoskeletal:      Cervical back: Normal range of motion. Lymphadenopathy:      Cervical: No cervical adenopathy. Skin:     General: Skin is warm and dry. Neurological:      General: No focal deficit present. Mental Status: She is alert. Psychiatric:         Mood and Affect: Mood normal.         Behavior: Behavior normal.         The history was obtained from the review of the chart, patient.     Lab Results:   Lab Results   Component Value Date/Time    TSH 3RD GENERATON 1.909 12/11/2023 08:25 AM    Free T4 0.68 12/11/2023 08:25 AM     Lab Results   Component Value Date    SODIUM 136 12/11/2023    K 3.8 12/11/2023     12/11/2023    CO2 24 12/11/2023    AGAP 8 12/11/2023    BUN 14 12/11/2023    CREATININE 0.79 12/11/2023    GLUC 84 10/11/2016    GLUF 97 12/11/2023    CALCIUM 9.2 12/11/2023    AST 12 (L) 12/11/2023 ALT 13 12/11/2023    ALKPHOS 63 12/11/2023    TP 7.1 12/11/2023    TBILI 0.52 12/11/2023    EGFR 97 12/11/2023     Lab Results   Component Value Date    CHOLESTEROL 178 12/11/2023    CHOLESTEROL 195 06/01/2022    CHOLESTEROL 223 (H) 11/17/2021     Lab Results   Component Value Date    HDL 76 12/11/2023    HDL 66 06/01/2022    HDL 77 11/17/2021     Lab Results   Component Value Date    TRIG 97 12/11/2023    TRIG 155 (H) 06/01/2022    TRIG 139 11/17/2021     No results found for: "[address]"    Lab Results   Component Value Date    LDLCALC 83 12/11/2023     Lab Results   Component Value Date    HGBA1C 5.2 12/11/2023         Imaging Studies:   Results for orders placed during the hospital encounter of 10/11/19    US thyroid    Impression  1. Heterogeneous appearance of the thyroid gland in keeping with a #the side that is. 2.  Previously described nodule within the isthmus is favored to represent a juxta thyroidal lymph node. Reference: ACR Thyroid Imaging, Reporting and Data System (TI-RADS): White Paper of the [employer]. J AM Karon Radiol 4956;28:043-468. (additional recommendations based on American Thyroid Association 2015 guidelines.)      Workstation performed: [id number]      I have personally reviewed pertinent reports. Portions of the record may have been created with voice recognition software. Occasional wrong word or "sound a like" substitutions may have occurred due to the inherent limitations of voice recognition software. Read the chart carefully and recognize, using context, where substitutions have occurred.

## 2023-12-18 DIAGNOSIS — E03.8 OTHER SPECIFIED HYPOTHYROIDISM: ICD-10-CM

## 2023-12-19 RX ORDER — LEVOTHYROXINE SODIUM 0.05 MG/1
TABLET ORAL
Qty: 120 TABLET | Refills: 3 | Status: SHIPPED | OUTPATIENT
Start: 2023-12-19

## 2023-12-19 NOTE — TELEPHONE ENCOUNTER
Requested medication(s) are due for refill today: Yes  Patient has already received a courtesy refill: No  Other reason request has been forwarded to provider: failed protocol

## 2024-12-02 DIAGNOSIS — E03.8 OTHER SPECIFIED HYPOTHYROIDISM: ICD-10-CM

## 2024-12-03 RX ORDER — LEVOTHYROXINE SODIUM 50 UG/1
TABLET ORAL
Qty: 117 TABLET | Refills: 3 | Status: SHIPPED | OUTPATIENT
Start: 2024-12-03

## 2024-12-16 ENCOUNTER — RESULTS FOLLOW-UP (OUTPATIENT)
Dept: ENDOCRINOLOGY | Facility: CLINIC | Age: 34
End: 2024-12-16

## 2024-12-16 ENCOUNTER — APPOINTMENT (OUTPATIENT)
Dept: LAB | Facility: HOSPITAL | Age: 34
End: 2024-12-16
Payer: COMMERCIAL

## 2024-12-16 ENCOUNTER — TELEPHONE (OUTPATIENT)
Age: 34
End: 2024-12-16

## 2024-12-16 DIAGNOSIS — E06.3 HYPOTHYROIDISM DUE TO HASHIMOTO'S THYROIDITIS: Primary | ICD-10-CM

## 2024-12-16 DIAGNOSIS — R73.01 IMPAIRED FASTING GLUCOSE: ICD-10-CM

## 2024-12-16 DIAGNOSIS — E78.5 HYPERLIPIDEMIA, UNSPECIFIED HYPERLIPIDEMIA TYPE: ICD-10-CM

## 2024-12-16 LAB
ALBUMIN SERPL BCG-MCNC: 4.5 G/DL (ref 3.5–5)
ALP SERPL-CCNC: 79 U/L (ref 34–104)
ALT SERPL W P-5'-P-CCNC: 21 U/L (ref 7–52)
ANION GAP SERPL CALCULATED.3IONS-SCNC: 9 MMOL/L (ref 4–13)
AST SERPL W P-5'-P-CCNC: 15 U/L (ref 13–39)
BILIRUB SERPL-MCNC: 0.97 MG/DL (ref 0.2–1)
BUN SERPL-MCNC: 8 MG/DL (ref 5–25)
CALCIUM SERPL-MCNC: 9.5 MG/DL (ref 8.4–10.2)
CHLORIDE SERPL-SCNC: 101 MMOL/L (ref 96–108)
CHOLEST SERPL-MCNC: 211 MG/DL (ref ?–200)
CO2 SERPL-SCNC: 25 MMOL/L (ref 21–32)
CREAT SERPL-MCNC: 0.97 MG/DL (ref 0.6–1.3)
EST. AVERAGE GLUCOSE BLD GHB EST-MCNC: 103 MG/DL
GFR SERPL CREATININE-BSD FRML MDRD: 75 ML/MIN/1.73SQ M
GLUCOSE P FAST SERPL-MCNC: 93 MG/DL (ref 65–99)
HBA1C MFR BLD: 5.2 %
HDLC SERPL-MCNC: 83 MG/DL
LDLC SERPL CALC-MCNC: 103 MG/DL (ref 0–100)
POTASSIUM SERPL-SCNC: 3.3 MMOL/L (ref 3.5–5.3)
PROT SERPL-MCNC: 7.5 G/DL (ref 6.4–8.4)
SODIUM SERPL-SCNC: 135 MMOL/L (ref 135–147)
T4 FREE SERPL-MCNC: 1.03 NG/DL (ref 0.61–1.12)
TRIGL SERPL-MCNC: 125 MG/DL (ref ?–150)
TSH SERPL DL<=0.05 MIU/L-ACNC: 1.67 UIU/ML (ref 0.45–4.5)

## 2024-12-16 PROCEDURE — 80061 LIPID PANEL: CPT

## 2024-12-16 PROCEDURE — 84439 ASSAY OF FREE THYROXINE: CPT

## 2024-12-16 PROCEDURE — 36415 COLL VENOUS BLD VENIPUNCTURE: CPT

## 2024-12-16 PROCEDURE — 83036 HEMOGLOBIN GLYCOSYLATED A1C: CPT

## 2024-12-16 PROCEDURE — 80053 COMPREHEN METABOLIC PANEL: CPT

## 2024-12-16 PROCEDURE — 84443 ASSAY THYROID STIM HORMONE: CPT

## 2024-12-16 NOTE — TELEPHONE ENCOUNTER
Chintan from [facility] lab on line, patient there now and her lab orders  3 days ago, she needs new orders. Attempted to call [facility] office unable to connect. Please advise.

## 2024-12-20 ENCOUNTER — OFFICE VISIT (OUTPATIENT)
Dept: ENDOCRINOLOGY | Facility: CLINIC | Age: 34
End: 2024-12-20
Payer: COMMERCIAL

## 2024-12-20 VITALS
WEIGHT: 176 LBS | DIASTOLIC BLOOD PRESSURE: 86 MMHG | HEART RATE: 76 BPM | SYSTOLIC BLOOD PRESSURE: 116 MMHG | HEIGHT: 65 IN | OXYGEN SATURATION: 99 % | TEMPERATURE: 97.6 F | BODY MASS INDEX: 29.32 KG/M2

## 2024-12-20 DIAGNOSIS — R19.7 DIARRHEA, UNSPECIFIED TYPE: ICD-10-CM

## 2024-12-20 DIAGNOSIS — E78.5 HYPERLIPIDEMIA, UNSPECIFIED HYPERLIPIDEMIA TYPE: ICD-10-CM

## 2024-12-20 DIAGNOSIS — E06.3 HYPOTHYROIDISM DUE TO HASHIMOTO'S THYROIDITIS: Primary | ICD-10-CM

## 2024-12-20 PROCEDURE — 99214 OFFICE O/P EST MOD 30 MIN: CPT | Performed by: STUDENT IN AN ORGANIZED HEALTH CARE EDUCATION/TRAINING PROGRAM

## 2024-12-20 NOTE — PROGRESS NOTES
Name: Mirella Campbell      : 1990      MRN: [id number]  Encounter Provider: Kendrick Caldwell, DO  Encounter Date: 2024   Encounter department: [facility]  :  Assessment & Plan  Hypothyroidism due to Hashimoto's thyroiditis  Biochemically euthyroid. C/w present Rx LT4. Labs in 12-mo  Orders:  •  Comprehensive metabolic panel; Future  •  Lipid Panel with Direct LDL reflex; Future  •  TSH, 3rd generation; Future  •  T4, free; Future    Hyperlipidemia, unspecified hyperlipidemia type  Sound control with just mild elevation of LDL, low ASCVD risk  Orders:  •  Lipid Panel with Direct LDL reflex; Future    Diarrhea, unspecified type  Intermittent diarrhea. Given history of autoimmune thyroiditis, will screen for celiacs. Advised pursuit of symptom diary to help with identifying triggers  Orders:  •  Basic metabolic panel  •  Celiac Panel/Adult      RTC 12-mo    History of Present Illness   HPI  Mirella Campebll is a 34 y.o. female who presents in follow up of hypothyroidism.  She is reporting some worsening fatigue of the last 3 months but acknowledges increasing stressors at work and home.  Sleep is also been a little bit suboptimal.  She is on levothyroxine 50 mcg daily except for weekends where she takes 100 mcg.  She denies any compressive neck complaints or symptoms worrisome for hyperthyroidism.  She does note symptoms of diarrhea that may occur intermittently.  Sometimes they will happen multiple times a week other times they may labs for weeks.  She has not clearly identified any triggers.        Review of Systems   Constitutional:  Positive for fatigue. Negative for unexpected weight change.   Gastrointestinal:  Positive for diarrhea (intermittent, dairy is trigger, and maybe bread).   Psychiatric/Behavioral:  Positive for sleep disturbance. The patient is nervous/anxious (stress).    All other systems reviewed and are negative.         Objective   BP  "116/86 (BP Location: Left arm, Patient Position: Sitting)   Pulse 76   Temp 97.6 °F (36.4 °C)   Ht 5' 5\" (1.651 m)   Wt 79.8 kg (176 lb)   SpO2 99%   BMI 29.29 kg/m²      Physical Exam  Vitals reviewed.   Constitutional:       General: She is not in acute distress.     Appearance: Normal appearance.   HENT:      Head: Normocephalic and atraumatic.   Eyes:      General: No scleral icterus.     Conjunctiva/sclera: Conjunctivae normal.   Neck:      Thyroid: No thyroid mass, thyromegaly or thyroid tenderness.   Pulmonary:      Effort: Pulmonary effort is normal. No respiratory distress.   Musculoskeletal:         General: No deformity.      Cervical back: Normal range of motion.   Neurological:      General: No focal deficit present.      Mental Status: She is alert.   Psychiatric:         Mood and Affect: Mood normal.         Behavior: Behavior normal.         Component      Latest Ref Rng 12/16/2024   Sodium      135 - 147 mmol/L 135    Potassium      3.5 - 5.3 mmol/L 3.3 (L)    Chloride      96 - 108 mmol/L 101    Carbon Dioxide      21 - 32 mmol/L 25    ANION GAP      4 - 13 mmol/L 9    BUN      5 - 25 mg/dL 8    Creatinine      0.60 - 1.30 mg/dL 0.97    GLUCOSE, FASTING      65 - 99 mg/dL 93    Calcium      8.4 - 10.2 mg/dL 9.5    AST      13 - 39 U/L 15    ALT      7 - 52 U/L 21    ALK PHOS      34 - 104 U/L 79    Total Protein      6.4 - 8.4 g/dL 7.5    Albumin      3.5 - 5.0 g/dL 4.5    Total Bilirubin      0.20 - 1.00 mg/dL 0.97    GFR, Calculated      ml/min/1.73sq m 75    Cholesterol      See Comment mg/dL 211 (H)    Triglycerides      See Comment mg/dL 125    HDL      >=50 mg/dL 83    LDL Calculated      0 - 100 mg/dL 103 (H)    Hemoglobin A1C      Normal 4.0-5.6%; PreDiabetic 5.7-6.4%; Diabetic >=6.5%; Glycemic control for adults with diabetes <7.0% % 5.2    eAG, EST AVG Glucose      mg/dl 103    TSH 3RD GENERATON      0.450 - 4.500 uIU/mL 1.667    FREE T4      0.61 - 1.12 ng/dL 1.03     "   Legend:  (L) Low  (H) High

## 2024-12-20 NOTE — ASSESSMENT & PLAN NOTE
Sound control with just mild elevation of LDL, low ASCVD risk  Orders:  •  Lipid Panel with Direct LDL reflex; Future

## (undated) DEVICE — ELECTRODE BALL E-Z CLEAN 5 IN -0009

## (undated) DEVICE — SUT VICRYL 2-0 SH 27 IN UNDYED J417H

## (undated) DEVICE — GAUZE SPONGES,16 PLY: Brand: CURITY

## (undated) DEVICE — THREE-QUARTER SHEET: Brand: CONVERTORS

## (undated) DEVICE — GLOVE SRG BIOGEL 6.5

## (undated) DEVICE — CATH URET 12FR RED RUBBER

## (undated) DEVICE — GLOVE INDICATOR PI UNDERGLOVE SZ 6.5 BLUE

## (undated) DEVICE — TUBING SUCTION 5MM X 12 FT

## (undated) DEVICE — SUT VICRYL 0 CT-1 27 IN J260H

## (undated) DEVICE — CHLORHEXIDINE 4PCT 4 OZ

## (undated) DEVICE — SPONGE STICK WITH PVP-I: Brand: KENDALL

## (undated) DEVICE — NEEDLE SPINAL18G X 3.5 IN QUINCKE

## (undated) DEVICE — LUBRICANT SURGILUBE TUBE 4 OZ  FLIP TOP

## (undated) DEVICE — 1820 FOAM BLOCK NEEDLE COUNTER: Brand: DEVON

## (undated) DEVICE — MEDI-VAC YANKAUER SUCTION HANDLE W/BULBOUS AND CONTROL VENT: Brand: CARDINAL HEALTH

## (undated) DEVICE — VIAL DECANTER

## (undated) DEVICE — SYRINGE 10ML LL

## (undated) DEVICE — SURGICEL 4 X 8

## (undated) DEVICE — LIGHT GLOVE GREEN

## (undated) DEVICE — LEEP LOOP ELECTRODE MEDIUM 15 X 15

## (undated) DEVICE — MAXI PAD5.51 X 13.78 IN. (14.0 X 35.0 CM)HEAVYCONTOUREDUNSCENTED: Brand: CURITY

## (undated) DEVICE — PENCIL ELECTROSURG E-Z CLEAN -0035H

## (undated) DEVICE — STRL ALLENTOWN HYSTEROSCOPY PK: Brand: CARDINAL HEALTH

## (undated) DEVICE — GLOVE INDICATOR PI UNDERGLOVE SZ 7.5 BLUE

## (undated) DEVICE — PREMIUM DRY TRAY LF: Brand: MEDLINE INDUSTRIES, INC.